# Patient Record
Sex: FEMALE | Race: BLACK OR AFRICAN AMERICAN | NOT HISPANIC OR LATINO | Employment: UNEMPLOYED | URBAN - METROPOLITAN AREA
[De-identification: names, ages, dates, MRNs, and addresses within clinical notes are randomized per-mention and may not be internally consistent; named-entity substitution may affect disease eponyms.]

---

## 2017-04-17 ENCOUNTER — HOSPITAL ENCOUNTER (EMERGENCY)
Facility: MEDICAL CENTER | Age: 22
End: 2017-04-17
Attending: EMERGENCY MEDICINE
Payer: MEDICAID

## 2017-04-17 ENCOUNTER — APPOINTMENT (OUTPATIENT)
Dept: RADIOLOGY | Facility: MEDICAL CENTER | Age: 22
End: 2017-04-17
Attending: EMERGENCY MEDICINE
Payer: MEDICAID

## 2017-04-17 VITALS
SYSTOLIC BLOOD PRESSURE: 109 MMHG | RESPIRATION RATE: 14 BRPM | HEART RATE: 99 BPM | HEIGHT: 66 IN | DIASTOLIC BLOOD PRESSURE: 68 MMHG | BODY MASS INDEX: 34.76 KG/M2 | TEMPERATURE: 99 F | OXYGEN SATURATION: 99 % | WEIGHT: 216.27 LBS

## 2017-04-17 DIAGNOSIS — M53.3 COCCYGEAL PAIN: ICD-10-CM

## 2017-04-17 PROCEDURE — 72220 X-RAY EXAM SACRUM TAILBONE: CPT

## 2017-04-17 PROCEDURE — 99284 EMERGENCY DEPT VISIT MOD MDM: CPT

## 2017-04-17 RX ORDER — DOCUSATE SODIUM 100 MG/1
100 CAPSULE, LIQUID FILLED ORAL 2 TIMES DAILY
Qty: 14 CAP | Refills: 0 | Status: SHIPPED | OUTPATIENT
Start: 2017-04-17 | End: 2017-12-03

## 2017-04-17 RX ORDER — DOCUSATE SODIUM 100 MG/1
100 CAPSULE, LIQUID FILLED ORAL 2 TIMES DAILY
Qty: 20 CAP | Refills: 0 | Status: SHIPPED | OUTPATIENT
Start: 2017-04-17 | End: 2017-04-17 | Stop reason: CLARIF

## 2017-04-17 RX ORDER — DOCUSATE SODIUM 100 MG/1
100 CAPSULE, LIQUID FILLED ORAL 2 TIMES DAILY
Qty: 14 CAP | Refills: 0 | Status: SHIPPED | OUTPATIENT
Start: 2017-04-17 | End: 2017-04-17 | Stop reason: CLARIF

## 2017-04-17 RX ORDER — HYDROCODONE BITARTRATE AND ACETAMINOPHEN 5; 325 MG/1; MG/1
1 TABLET ORAL EVERY 6 HOURS PRN
Qty: 15 TAB | Refills: 0 | Status: SHIPPED | OUTPATIENT
Start: 2017-04-17 | End: 2017-12-03

## 2017-04-17 ASSESSMENT — PAIN SCALES - GENERAL
PAINLEVEL_OUTOF10: 8
PAINLEVEL_OUTOF10: 6

## 2017-04-17 ASSESSMENT — LIFESTYLE VARIABLES: DO YOU DRINK ALCOHOL: NO

## 2017-04-17 NOTE — ED AVS SNAPSHOT
Home Care Instructions                                                                                                                Brianna Ruby   MRN: [id number]    Department:  [facility], Emergency Dept   Date of Visit:  4/17/2017            [facility], Emergency Dept    [address]    [place] [address]    Phone:  [phone]      You were seen by     Chester Batista M.D.      Your Diagnosis Was     Coccygeal pain     M53.3       Follow-up Information     1. Follow up with [facility].    Specialty:  Family Medicine    Why:  As needed    Contact information    [address]  [address]  [place] [id number] [phone]        Medication Information     Review all of your home medications and newly ordered medications with your primary doctor and/or pharmacist as soon as possible. Follow medication instructions as directed by your doctor and/or pharmacist.     Please keep your complete medication list with you and share with your physician. Update the information when medications are discontinued, doses are changed, or new medications (including over-the-counter products) are added; and carry medication information at all times in the event of emergency situations.               Medication List      START taking these medications        Instructions    Morning Afternoon Evening Bedtime    docusate sodium 100 MG Caps   Commonly known as:  COLACE        Take 1 Cap by mouth 2 times a day.   Dose:  100 mg                        hydrocodone-acetaminophen 5-325 MG Tabs per tablet   Commonly known as:  NORCO        Take 1 Tab by mouth every 6 hours as needed.   Dose:  1 Tab                             Where to Get Your Medications      These medications were sent to [facility] [place] - [address]  [address] [place] [address]     Phone:  [phone]    - docusate sodium 100 MG Caps      You can get these medications from any  pharmacy     Bring a paper prescription for each of these medications    - hydrocodone-acetaminophen 5-325 MG Tabs per tablet            Procedures and tests performed during your visit     DX-SACRUM AND COCCYX 2+        Discharge Instructions       Tailbone Injury  The tailbone (coccyx) is the small bone at the lower end of the spine. A tailbone injury may involve stretched ligaments, bruising, or a broken bone (fracture). Tailbone injuries can be painful, and some may take a long time to heal.  CAUSES  This condition is often caused by falling and landing on the tailbone. Other causes include:  · Repeated strain or friction from actions such as rowing and bicycling.  · Childbirth.  In some cases, the cause may not be known.  RISK FACTORS  This condition is more common in women than in men.  SYMPTOMS  Symptoms of this condition include:  · Pain in the lower back, especially when sitting.  · Pain or difficulty when standing up from a sitting position.  · Bruising in the tailbone area.  · Painful bowel movements.  · In women, pain during intercourse.  DIAGNOSIS  This condition may be diagnosed based on your symptoms and a physical exam. X-rays may be taken if a fracture is suspected. You may also have other tests, such as a CT scan or MRI.  TREATMENT  This condition may be treated with medicines to help relieve your pain. Most tailbone injuries heal on their own in 4-6 weeks. However, recovery time may be longer if the injury involves a fracture.  HOME CARE INSTRUCTIONS  · Take medicines only as directed by your health care provider.  · If directed, apply ice to the injured area:  ¨ Put ice in a plastic bag.  ¨ Place a towel between your skin and the bag.  ¨ Leave the ice on for 20 minutes, 2-3 times per day for the first 1-2 days.  · Sit on a large, rubber or inflated ring or cushion to ease your pain. Lean forward when you are sitting to help decrease discomfort.  · Avoid sitting for long periods of  time.  · Increase your activity as the pain allows. Perform any exercises that are recommended by your health care provider or physical therapist.  · If you have pain during bowel movements, use stool softeners as directed by your health care provider.  · Eat a diet that includes plenty of fiber to help prevent constipation.  · Keep all follow-up visits as directed by your health care provider. This is important.  PREVENTION  Wear appropriate padding and sports gear when bicycling and rowing. This can help to prevent developing an injury that is caused by repeated strain or friction.  SEEK MEDICAL CARE IF:  · Your pain becomes worse.  · Your bowel movements cause a great deal of discomfort.  · You are unable to have a bowel movement.  · You have uncontrolled urine loss (urinary incontinence).  · You have a fever.     This information is not intended to replace advice given to you by your health care provider. Make sure you discuss any questions you have with your health care provider.     Document Released: 12/15/2001 Document Revised: 05/03/2016 Document Reviewed: 12/14/2015  [employer] Interactive Patient Education ©2016 [employer]            Patient Information     Patient Information    Following emergency treatment: all patient requiring follow-up care must return either to a private physician or a clinic if your condition worsens before you are able to obtain further medical attention, please return to the emergency room.     Billing Information    At [facility], we work to make the billing process streamlined for our patients.  Our Representatives are here to answer any questions you may have regarding your hospital bill.  If you have insurance coverage and have supplied your insurance information to us, we will submit a claim to your insurer on your behalf.  Should you have any questions regarding your bill, we can be reached online or by phone as follows:  Online: You are able pay your bills online or  live chat with our representatives about any billing questions you may have. We are here to help Monday - Friday from 8:00am to 7:30pm and 9:00am - 12:00pm on Saturdays.  Please visit [email or web address]  for more information.   Phone:  [phone] or [phone]    Please note that your emergency physician, surgeon, pathologist, radiologist, anesthesiologist, and other specialists are not employed by [facility] and will therefore bill separately for their services.  Please contact them directly for any questions concerning their bills at the numbers below:     Emergency Physician Services:  [phone]  [facility]:  [phone]  [facility]:  [phone]  [facility]:  [phone]    1. Your final bill may vary from the amount quoted upon discharge if all procedures are not complete at that time, or if your doctor has additional procedures of which we are not aware. You will receive an additional bill if you return to the Emergency Department at [facility] for suture removal regardless of the facility of which the sutures were placed.     2. Please arrange for settlement of this account at the emergency registration.    3. All self-pay accounts are due in full at the time of treatment.  If you are unable to meet this obligation then payment is expected within 4-5 days.     4. If you have had radiology studies (CT, X-ray, Ultrasound, MRI), you have received a preliminary result during your emergency department visit. Please contact the radiology department [phone] to receive a copy of your final result. Please discuss the Final result with your primary physician or with the follow up physician provided.     Crisis Hotline:  National Crisis Hotline:  2-475-WOTBEXZ or 1-806.719.5617  [place] Crisis Hotline:    1-274.201.2134 or 883-281-4056         ED Discharge Follow Up Questions    1. In order to provide you with very  good care, we would like to follow up with a phone call in the next few days.  May we have your permission to contact you?     YES /  NO    2. What is the best phone number to call you? (       )_____-__________    3. What is the best time to call you?      Morning  /  Afternoon  /  Evening                   Patient Signature:  ____________________________________________________________    Date:  ____________________________________________________________

## 2017-04-17 NOTE — ED AVS SNAPSHOT
4/17/2017    Brianna Ruby  [address]  [place] [address]    Dear Brianna:    [facility] wants to ensure your discharge home is safe and you or your loved ones have had all of your questions answered regarding your care after you leave the hospital.    Below is a list of resources and contact information should you have any questions regarding your hospital stay, follow-up instructions, or active medical symptoms.    Questions or Concerns Regarding… Contact   Medical Questions Related to Your Discharge  (7 days a week, 8am-5pm) Contact a Nurse Care Coordinator   [phone]   Medical Questions Not Related to Your Discharge  (24 hours a day / 7 days a week)  Contact the Nurse Health Line   [phone]    Medications or Discharge Instructions Refer to your discharge packet   or contact your [facility] Primary Care Provider   [phone]   Follow-up Appointment(s) Schedule your appointment via [employer]   or contact Scheduling [phone]   Billing Review your statement via [employer]  or contact Billing [phone]   Medical Records Review your records via [employer]   or contact Medical Records [phone]     You may receive a telephone call within two days of discharge. This call is to make certain you understand your discharge instructions and have the opportunity to have any questions answered. You can also easily access your medical information, test results and upcoming appointments via the [employer] free online health management tool. You can learn more and sign up at [email or web address]. For assistance setting up your [employer] account, please call [phone].    Once again, we want to ensure your discharge home is safe and that you have a clear understanding of any next steps in your care. If you have any questions or concerns, please do not hesitate to contact us, we are here for you. Thank you for choosing [facility] for your healthcare needs.    Sincerely,    Your [facility] Healthcare Team

## 2017-04-17 NOTE — ED AVS SNAPSHOT
Lernstift Access Code: [email or web address]  Expires: 5/17/2017 11:02 PM    Lernstift  A secure, online tool to manage your health information     [employer]’s Lernstift® is a secure, online tool that connects you to your personalized health information from the privacy of your home -- day or night - making it very easy for you to manage your healthcare. Once the activation process is completed, you can even access your medical information using the Lernstift anahi, which is available for free in the Apple Anahi store or Google Play store.     Lernstift provides the following levels of access (as shown below):   My Chart Features   [facility] Primary Care Doctor [facility]  Specialists [facility]  Urgent  Care Non-[facility]  Primary Care  Doctor   Email your healthcare team securely and privately 24/7 X X X X   Manage appointments: schedule your next appointment; view details of past/upcoming appointments X      Request prescription refills. X      View recent personal medical records, including lab and immunizations X X X X   View health record, including health history, allergies, medications X X X X   Read reports about your outpatient visits, procedures, consult and ER notes X X X X   See your discharge summary, which is a recap of your hospital and/or ER visit that includes your diagnosis, lab results, and care plan. X X       How to register for Lernstift:  1. Go to  [email or web address].  2. Click on the Sign Up Now box, which takes you to the New Member Sign Up page. You will need to provide the following information:  a. Enter your Lernstift Access Code exactly as it appears at the top of this page. (You will not need to use this code after you’ve completed the sign-up process. If you do not sign up before the expiration date, you must request a new code.)   b. Enter your date of birth.   c. Enter your home email address.   d. Click Submit, and follow the next screen’s instructions.  3. Create a Lernstift ID. This will be your Lernstift  login ID and cannot be changed, so think of one that is secure and easy to remember.  4. Create a [facility] password. You can change your password at any time.  5. Enter your Password Reset Question and Answer. This can be used at a later time if you forget your password.   6. Enter your e-mail address. This allows you to receive e-mail notifications when new information is available in [facility].  7. Click Sign Up. You can now view your health information.    For assistance activating your [facility] account, call [phone]

## 2017-04-18 NOTE — ED NOTES
Chief Complaint   Patient presents with   • Back Pain     pt states she was wrestling/fighting and hurt her tailbone. pt states its been 1 month of tenderness and pain

## 2017-04-18 NOTE — ED PROVIDER NOTES
ED Provider Note    HPI: Patient is a 21-year-old female who presented to the emergency department April 17, 2017 at 7:21 PM with a chief complaint of coccygeal pain.    Patient was wrestling/fighting about a month ago. The patient twice was dropped onto her blood tox. The patient has pain in the coccygeal area. Patient notes pain with defecation. She denied numbness or tingling of her lower extremities. No other somatic complaints.    Review of Systems: Positive for coccygeal pain and pain with defecation negative for numbness tingling lower extremities incontinence    Past medical/surgical history: Strep throat asthma headache UTI    Medications: Depo-Provera    Allergies: Sulfa    Social History: Patient smokes several cigarettes daily no alcohol use       Physical exam: Constitutional: Pleasant female awake and alert  Vital signs:  Temperature 97.7 pulse 99 respirations 14 and blood pressure 109/68 pulse oximetry 95%  Musculoskeletal: Patient has pain with palpation of the upper portion of the coccygeal area. No crepitus felt over the exam somewhat problematic due to the patient's body habitus. No other pain with palpation or movement of muscle bone or joint. No other musculoskeletal deformities identified.  Neurologic: alert and awake answers questions appropriately. Moves all four extremities independently, no gross focal abnormalities identified. Normal strength and motor.  Skin: no rash or lesion seen, no palpable dermatologic lesions identified.  Psychiatric: not anxious, delusional, or hallucinating.    Medical decision making:  X-ray coccyx/sacrum obtained. No acute abnormality seen.    Given the presenting complaints and physical findings I suspect the patient has an occult coccygeal fracture. She was discharged on [place] [facility] [place]. I explained the patient is will likely take a couple more weeks to heal. Patient is applied to buy a doughnut to sit on which may help with her discomfort    Impression  coccygeal fracture

## 2017-04-18 NOTE — DISCHARGE INSTRUCTIONS
Tailbone Injury  The tailbone (coccyx) is the small bone at the lower end of the spine. A tailbone injury may involve stretched ligaments, bruising, or a broken bone (fracture). Tailbone injuries can be painful, and some may take a long time to heal.  CAUSES  This condition is often caused by falling and landing on the tailbone. Other causes include:  · Repeated strain or friction from actions such as rowing and bicycling.  · Childbirth.  In some cases, the cause may not be known.  RISK FACTORS  This condition is more common in women than in men.  SYMPTOMS  Symptoms of this condition include:  · Pain in the lower back, especially when sitting.  · Pain or difficulty when standing up from a sitting position.  · Bruising in the tailbone area.  · Painful bowel movements.  · In women, pain during intercourse.  DIAGNOSIS  This condition may be diagnosed based on your symptoms and a physical exam. X-rays may be taken if a fracture is suspected. You may also have other tests, such as a CT scan or MRI.  TREATMENT  This condition may be treated with medicines to help relieve your pain. Most tailbone injuries heal on their own in 4-6 weeks. However, recovery time may be longer if the injury involves a fracture.  HOME CARE INSTRUCTIONS  · Take medicines only as directed by your health care provider.  · If directed, apply ice to the injured area:  ¨ Put ice in a plastic bag.  ¨ Place a towel between your skin and the bag.  ¨ Leave the ice on for 20 minutes, 2-3 times per day for the first 1-2 days.  · Sit on a large, rubber or inflated ring or cushion to ease your pain. Lean forward when you are sitting to help decrease discomfort.  · Avoid sitting for long periods of time.  · Increase your activity as the pain allows. Perform any exercises that are recommended by your health care provider or physical therapist.  · If you have pain during bowel movements, use stool softeners as directed by your health care provider.  · Eat a  diet that includes plenty of fiber to help prevent constipation.  · Keep all follow-up visits as directed by your health care provider. This is important.  PREVENTION  Wear appropriate padding and sports gear when bicycling and rowing. This can help to prevent developing an injury that is caused by repeated strain or friction.  SEEK MEDICAL CARE IF:  · Your pain becomes worse.  · Your bowel movements cause a great deal of discomfort.  · You are unable to have a bowel movement.  · You have uncontrolled urine loss (urinary incontinence).  · You have a fever.     This information is not intended to replace advice given to you by your health care provider. Make sure you discuss any questions you have with your health care provider.     Document Released: 12/15/2001 Document Revised: 05/03/2016 Document Reviewed: 12/14/2015  [employer] Interactive Patient Education ©2016 [employer]

## 2017-04-18 NOTE — ED NOTES
All DC discussed for coccyx pain. She has been instructed to follow up with her primary and to returned to ER for increased pain and changes in bowel or bladder control.  Pt verbalized understanding of all DC instructions, prescriptions and follow up recommendations. Pt ambulated to lobby with upright and steady gait.

## 2017-04-18 NOTE — ED NOTES
Pt states she fell onto buttock x2. She has tenderness when sitting. Pain is now moving into low back. 5/5 strength to bilateral lower legs.

## 2017-12-03 ENCOUNTER — HOSPITAL ENCOUNTER (EMERGENCY)
Facility: MEDICAL CENTER | Age: 22
End: 2017-12-04
Attending: EMERGENCY MEDICINE
Payer: MEDICAID

## 2017-12-03 ENCOUNTER — APPOINTMENT (OUTPATIENT)
Dept: RADIOLOGY | Facility: MEDICAL CENTER | Age: 22
End: 2017-12-03
Attending: EMERGENCY MEDICINE
Payer: MEDICAID

## 2017-12-03 VITALS
TEMPERATURE: 97 F | OXYGEN SATURATION: 98 % | HEIGHT: 66 IN | HEART RATE: 87 BPM | BODY MASS INDEX: 36.14 KG/M2 | WEIGHT: 224.87 LBS | RESPIRATION RATE: 16 BRPM | DIASTOLIC BLOOD PRESSURE: 84 MMHG | SYSTOLIC BLOOD PRESSURE: 139 MMHG

## 2017-12-03 DIAGNOSIS — K29.00 ACUTE GASTRITIS WITHOUT HEMORRHAGE, UNSPECIFIED GASTRITIS TYPE: ICD-10-CM

## 2017-12-03 LAB
ALBUMIN SERPL BCP-MCNC: 3.8 G/DL (ref 3.2–4.9)
ALBUMIN/GLOB SERPL: 1.1 G/DL
ALP SERPL-CCNC: 66 U/L (ref 30–99)
ALT SERPL-CCNC: 19 U/L (ref 2–50)
ANION GAP SERPL CALC-SCNC: 7 MMOL/L (ref 0–11.9)
APPEARANCE UR: CLEAR
AST SERPL-CCNC: 17 U/L (ref 12–45)
BACTERIA #/AREA URNS HPF: NEGATIVE /HPF
BASOPHILS # BLD AUTO: 0.3 % (ref 0–1.8)
BASOPHILS # BLD: 0.03 K/UL (ref 0–0.12)
BILIRUB SERPL-MCNC: 0.2 MG/DL (ref 0.1–1.5)
BILIRUB UR QL STRIP.AUTO: NEGATIVE
BUN SERPL-MCNC: 15 MG/DL (ref 8–22)
CALCIUM SERPL-MCNC: 9.4 MG/DL (ref 8.5–10.5)
CHLORIDE SERPL-SCNC: 105 MMOL/L (ref 96–112)
CO2 SERPL-SCNC: 23 MMOL/L (ref 20–33)
COLOR UR: YELLOW
CREAT SERPL-MCNC: 0.88 MG/DL (ref 0.5–1.4)
EKG IMPRESSION: NORMAL
EOSINOPHIL # BLD AUTO: 0.6 K/UL (ref 0–0.51)
EOSINOPHIL NFR BLD: 6.3 % (ref 0–6.9)
EPI CELLS #/AREA URNS HPF: NORMAL /HPF
ERYTHROCYTE [DISTWIDTH] IN BLOOD BY AUTOMATED COUNT: 39.3 FL (ref 35.9–50)
GFR SERPL CREATININE-BSD FRML MDRD: >60 ML/MIN/1.73 M 2
GLOBULIN SER CALC-MCNC: 3.5 G/DL (ref 1.9–3.5)
GLUCOSE SERPL-MCNC: 102 MG/DL (ref 65–99)
GLUCOSE UR STRIP.AUTO-MCNC: NEGATIVE MG/DL
HCG SERPL QL: NEGATIVE
HCT VFR BLD AUTO: 41.4 % (ref 37–47)
HGB BLD-MCNC: 13.8 G/DL (ref 12–16)
HYALINE CASTS #/AREA URNS LPF: NORMAL /LPF
IMM GRANULOCYTES # BLD AUTO: 0.01 K/UL (ref 0–0.11)
IMM GRANULOCYTES NFR BLD AUTO: 0.1 % (ref 0–0.9)
KETONES UR STRIP.AUTO-MCNC: NEGATIVE MG/DL
LEUKOCYTE ESTERASE UR QL STRIP.AUTO: NEGATIVE
LIPASE SERPL-CCNC: 33 U/L (ref 11–82)
LYMPHOCYTES # BLD AUTO: 3.53 K/UL (ref 1–4.8)
LYMPHOCYTES NFR BLD: 37.3 % (ref 22–41)
MCH RBC QN AUTO: 29.1 PG (ref 27–33)
MCHC RBC AUTO-ENTMCNC: 33.3 G/DL (ref 33.6–35)
MCV RBC AUTO: 87.3 FL (ref 81.4–97.8)
MICRO URNS: ABNORMAL
MONOCYTES # BLD AUTO: 0.71 K/UL (ref 0–0.85)
MONOCYTES NFR BLD AUTO: 7.5 % (ref 0–13.4)
NEUTROPHILS # BLD AUTO: 4.59 K/UL (ref 2–7.15)
NEUTROPHILS NFR BLD: 48.5 % (ref 44–72)
NITRITE UR QL STRIP.AUTO: NEGATIVE
NRBC # BLD AUTO: 0 K/UL
NRBC BLD AUTO-RTO: 0 /100 WBC
PH UR STRIP.AUTO: 7 [PH]
PLATELET # BLD AUTO: 289 K/UL (ref 164–446)
PMV BLD AUTO: 8.6 FL (ref 9–12.9)
POTASSIUM SERPL-SCNC: 3.7 MMOL/L (ref 3.6–5.5)
PROT SERPL-MCNC: 7.3 G/DL (ref 6–8.2)
PROT UR QL STRIP: NEGATIVE MG/DL
RBC # BLD AUTO: 4.74 M/UL (ref 4.2–5.4)
RBC # URNS HPF: NORMAL /HPF
RBC UR QL AUTO: ABNORMAL
SODIUM SERPL-SCNC: 135 MMOL/L (ref 135–145)
SP GR UR STRIP.AUTO: 1.03
UROBILINOGEN UR STRIP.AUTO-MCNC: 1 MG/DL
WBC # BLD AUTO: 9.5 K/UL (ref 4.8–10.8)
WBC #/AREA URNS HPF: NORMAL /HPF

## 2017-12-03 PROCEDURE — 80053 COMPREHEN METABOLIC PANEL: CPT

## 2017-12-03 PROCEDURE — 93005 ELECTROCARDIOGRAM TRACING: CPT

## 2017-12-03 PROCEDURE — 93005 ELECTROCARDIOGRAM TRACING: CPT | Performed by: EMERGENCY MEDICINE

## 2017-12-03 PROCEDURE — 81001 URINALYSIS AUTO W/SCOPE: CPT

## 2017-12-03 PROCEDURE — 76705 ECHO EXAM OF ABDOMEN: CPT

## 2017-12-03 PROCEDURE — 83690 ASSAY OF LIPASE: CPT

## 2017-12-03 PROCEDURE — 85025 COMPLETE CBC W/AUTO DIFF WBC: CPT

## 2017-12-03 PROCEDURE — 84703 CHORIONIC GONADOTROPIN ASSAY: CPT

## 2017-12-03 PROCEDURE — 99284 EMERGENCY DEPT VISIT MOD MDM: CPT

## 2017-12-03 RX ORDER — ALUMINA, MAGNESIA, AND SIMETHICONE 2400; 2400; 240 MG/30ML; MG/30ML; MG/30ML
10 SUSPENSION ORAL 4 TIMES DAILY PRN
Qty: 560 ML | Refills: 0 | Status: SHIPPED | OUTPATIENT
Start: 2017-12-03 | End: 2019-10-02

## 2017-12-03 RX ORDER — OMEPRAZOLE 20 MG/1
20 CAPSULE, DELAYED RELEASE ORAL DAILY
Qty: 30 CAP | Refills: 0 | Status: SHIPPED | OUTPATIENT
Start: 2017-12-03 | End: 2019-10-02

## 2017-12-03 ASSESSMENT — ENCOUNTER SYMPTOMS
DIZZINESS: 0
ABDOMINAL PAIN: 1
MUSCULOSKELETAL NEGATIVE: 1
RESPIRATORY NEGATIVE: 1
VOMITING: 1
CARDIOVASCULAR NEGATIVE: 1
EYES NEGATIVE: 1
CONSTIPATION: 0
DIARRHEA: 0
SENSORY CHANGE: 0
NAUSEA: 1
FEVER: 0
TREMORS: 0

## 2017-12-03 ASSESSMENT — PAIN SCALES - GENERAL: PAINLEVEL_OUTOF10: 6

## 2017-12-04 NOTE — ED PROVIDER NOTES
ED Provider Note    CHIEF COMPLAINT  Chief Complaint   Patient presents with   • RUQ Pain     x1 month, intermittent, radiates to back.   • Back Pain     x1 month, intermittent pain between shoulder blades   • Nausea/Vomiting/Diarrhea   • Sweats     x1 day, cold sweats at night   • Epigastric Pain     pain radiates to RUQ and back.        HPI  Brianna Ruby is a 22 y.o. female who presents With abdominal pain. She reports abdominal pain for one month. Abdominal pain is epigastric and right upper quadrant. Abdominal pain is only after eating, around 5-10 minutes. Patient reports pain is generally after eating something spicy eating something greasy or eating too much. She reports mild associated nausea. She denies any association of pain with exertion and denies any decreased exertional capacity. She denies any associated shortness of breath. Patient reports that she had one episode of nonbilious nonbloody emesis today. The symptoms resolved without any intervention after around 5-10 minutes. She is currently not in any pain. She denies any changes in her bowel movements, she denies any blood or melena. She denies any fever or constitutional symptoms any dysuria and hematuria any vaginal discharge or vaginal bleeding outside of her cycle. Her last period was around one half weeks ago.    REVIEW OF SYSTEMS  Review of Systems   Constitutional: Negative for fever and malaise/fatigue.   HENT: Negative.    Eyes: Negative.    Respiratory: Negative.    Cardiovascular: Negative.    Gastrointestinal: Positive for abdominal pain, nausea and vomiting. Negative for constipation and diarrhea.   Genitourinary: Negative for dysuria, frequency and urgency.   Musculoskeletal: Negative.    Neurological: Negative for dizziness, tremors and sensory change.       See HPI for further details. All other systems are negative.     PAST MEDICAL HISTORY   has a past medical history of ASTHMA (1996); GBS (group B Streptococcus carrier),  +RV culture, currently pregnant (2/10/2014); Headache(784.0); Migraine; Strep throat; and Urinary tract infection, site not specified (8/4/13).    SOCIAL HISTORY  Social History     Social History Main Topics   • Smoking status: Current Every Day Smoker     Packs/day: 0.20     Years: 4.00     Types: Cigarettes     Last attempt to quit: 8/1/2014   • Smokeless tobacco: Never Used   • Alcohol use No   • Drug use: No   • Sexual activity: Yes     Partners: Male       SURGICAL HISTORY  patient denies any surgical history    CURRENT MEDICATIONS  Home Medications     Reviewed by Mai Aceves R.N. (Registered Nurse) on 12/03/17 at 2139  Med List Status: Complete   Medication Last Dose Status        Patient Darrell Taking any Medications                       ALLERGIES  Allergies   Allergen Reactions   • Sulfa Drugs        PHYSICAL EXAM  Physical Exam   Constitutional: She is oriented to person, place, and time. She appears well-developed and well-nourished.   HENT:   Head: Normocephalic and atraumatic.   Eyes: Conjunctivae are normal. Pupils are equal, round, and reactive to light.   Neck: Normal range of motion. Neck supple.   Abdominal: Soft. Bowel sounds are normal. She exhibits no distension and no mass. There is no rebound and no guarding.   Mild epigastric and right upper quadrant tenderness Francisco's negative   Neurological: She is alert and oriented to person, place, and time.   Skin: Skin is warm and dry.       Results for orders placed or performed during the hospital encounter of 12/03/17   CBC WITH DIFFERENTIAL   Result Value Ref Range    WBC 9.5 4.8 - 10.8 K/uL    RBC 4.74 4.20 - 5.40 M/uL    Hemoglobin 13.8 12.0 - 16.0 g/dL    Hematocrit 41.4 37.0 - 47.0 %    MCV 87.3 81.4 - 97.8 fL    MCH 29.1 27.0 - 33.0 pg    MCHC 33.3 (L) 33.6 - 35.0 g/dL    RDW 39.3 35.9 - 50.0 fL    Platelet Count 289 164 - 446 K/uL    MPV 8.6 (L) 9.0 - 12.9 fL    Neutrophils-Polys 48.50 44.00 - 72.00 %    Lymphocytes 37.30 22.00 -  41.00 %    Monocytes 7.50 0.00 - 13.40 %    Eosinophils 6.30 0.00 - 6.90 %    Basophils 0.30 0.00 - 1.80 %    Immature Granulocytes 0.10 0.00 - 0.90 %    Nucleated RBC 0.00 /100 WBC    Neutrophils (Absolute) 4.59 2.00 - 7.15 K/uL    Lymphs (Absolute) 3.53 1.00 - 4.80 K/uL    Monos (Absolute) 0.71 0.00 - 0.85 K/uL    Eos (Absolute) 0.60 (H) 0.00 - 0.51 K/uL    Baso (Absolute) 0.03 0.00 - 0.12 K/uL    Immature Granulocytes (abs) 0.01 0.00 - 0.11 K/uL    NRBC (Absolute) 0.00 K/uL   COMP METABOLIC PANEL   Result Value Ref Range    Sodium 135 135 - 145 mmol/L    Potassium 3.7 3.6 - 5.5 mmol/L    Chloride 105 96 - 112 mmol/L    Co2 23 20 - 33 mmol/L    Anion Gap 7.0 0.0 - 11.9    Glucose 102 (H) 65 - 99 mg/dL    Bun 15 8 - 22 mg/dL    Creatinine 0.88 0.50 - 1.40 mg/dL    Calcium 9.4 8.5 - 10.5 mg/dL    AST(SGOT) 17 12 - 45 U/L    ALT(SGPT) 19 2 - 50 U/L    Alkaline Phosphatase 66 30 - 99 U/L    Total Bilirubin 0.2 0.1 - 1.5 mg/dL    Albumin 3.8 3.2 - 4.9 g/dL    Total Protein 7.3 6.0 - 8.2 g/dL    Globulin 3.5 1.9 - 3.5 g/dL    A-G Ratio 1.1 g/dL   LIPASE   Result Value Ref Range    Lipase 33 11 - 82 U/L   BETA-HCG QUALITATIVE SERUM   Result Value Ref Range    Beta-Hcg Qualitative Serum Negative Negative   ESTIMATED GFR   Result Value Ref Range    GFR If African American >60 >60 mL/min/1.73 m 2    GFR If Non African American >60 >60 mL/min/1.73 m 2   URINALYSIS (UA)   Result Value Ref Range    Color Yellow     Character Clear     Specific Gravity 1.027 <1.035    Ph 7.0 5.0 - 8.0    Glucose Negative Negative mg/dL    Ketones Negative Negative mg/dL    Protein Negative Negative mg/dL    Bilirubin Negative Negative    Urobilinogen, Urine 1.0 Negative    Nitrite Negative Negative    Leukocyte Esterase Negative Negative    Occult Blood Trace (A) Negative    Micro Urine Req Microscopic    URINE MICROSCOPIC (W/UA)   Result Value Ref Range    WBC 0-2 /hpf    RBC 0-2 /hpf    Bacteria Negative None /hpf    Epithelial Cells Few /hpf     Hyaline Cast 0-2 /lpf   EKG (NOW)   Result Value Ref Range    Report       [facility]    Test Date:  2017  Pt Name:    HARINDER BRADFORD               Department: ER  MRN:        [id number]                      Room:  Gender:     F                            Technician: [id number]  :        1995                   Requested By:ER TRIAGE PROTOCOL  Order #:    [id number]                    Reading MD:    Measurements  Intervals                                Axis  Rate:       73                           P:          39  MA:         152                          QRS:        36  QRSD:       78                           T:          23  QT:         364  QTc:        401    Interpretive Statements  SINUS RHYTHM  Compared to ECG 2014 07:35:21  Sinus tachycardia no longer present  Left ventricular hypertrophy no longer present         US-GALLBLADDER   Final Result      1.  Contracted gallbladder. No cholelithiasis or biliary dilatation.      2.  Mildly enlarged homogeneous liver.      3.  Normal appearance of the pancreas.      4.  Suspected medullary sponge kidney variant of both kidneys. No hydronephrosis.            COURSE & MEDICAL DECISION MAKING  Pertinent Labs & Imaging studies reviewed. (See chart for details)  1. Patient here with symptoms consistent with likely choledocholithiasis. Will check ultrasound and basic labs to rule out cholecystitis. Patient without any icterus or signs of choledocholithiasis. Will add bilirubin. Patient is currently not in any pain and is not requesting any analgesics at this time. We'll continue to reassess  Ultrasound is unremarkable. Basic labs are unremarkable. Patient with likely gastritis. Home with supportive care, and Maalox when necessary and omeprazole. Patient will follow up with her primary care physician and understands the symptoms do not resolve within the next month after treatment that possible gastrointestinal appointment  should be made for further evaluation and possible endoscopy. Return precautions otherwise have been discussed    The patient will not drink alcohol nor drive with prescribed medications. The patient will return for worsening symptoms and is stable at the time of discharge. The patient verbalizes understanding and will comply.    FINAL IMPRESSION  1. Gastritis         Electronically signed by: Lele Fleming, 12/3/2017 10:00 PM

## 2017-12-04 NOTE — ED NOTES
Brianna Ruby  22 y.o.  Female  Chief Complaint   Patient presents with   • RUQ Pain     x1 month, intermittent, radiates to back.   • Back Pain     x1 month, intermittent pain between shoulder blades   • Nausea/Vomiting/Diarrhea   • Sweats     x1 day, cold sweats at night   • Epigastric Pain     pain radiates to RUQ and back.      Patient with tech for EKG.    Explained wait time and triage process to pt, told to notify ED tech or triage RN of any changes, verbalized understanding.

## 2017-12-04 NOTE — DISCHARGE INSTRUCTIONS
Gastritis, Adult  Gastritis is soreness and puffiness (inflammation) of the lining of the stomach. If you do not get help, gastritis can cause bleeding and sores (ulcers) in the stomach.  HOME CARE   · Only take medicine as told by your doctor.  · If you were given antibiotic medicines, take them as told. Finish the medicines even if you start to feel better.  · Drink enough fluids to keep your pee (urine) clear or pale yellow.  · Avoid foods and drinks that make your problems worse. Foods you may want to avoid include:  ¨ Caffeine or alcohol.  ¨ Chocolate.  ¨ Mint.  ¨ Garlic and onions.  ¨ Spicy foods.  ¨ Citrus fruits, including oranges, jamarcus, or limes.  ¨ Food containing tomatoes, including sauce, chili, salsa, and pizza.  ¨ Fried and fatty foods.  · Eat small meals throughout the day instead of large meals.  GET HELP RIGHT AWAY IF:   · You have black or dark red poop (stools).  · You throw up (vomit) blood. It may look like coffee grounds.  · You cannot keep fluids down.  · Your belly (abdominal) pain gets worse.  · You have a fever.  · You do not feel better after 1 week.  · You have any other questions or concerns.  MAKE SURE YOU:   · Understand these instructions.  · Will watch your condition.  · Will get help right away if you are not doing well or get worse.     This information is not intended to replace advice given to you by your health care provider. Make sure you discuss any questions you have with your health care provider.     Document Released: 06/05/2009 Document Revised: 03/11/2013 Document Reviewed: 01/30/2013  [employer] Interactive Patient Education ©2016 [employer]

## 2018-11-05 ENCOUNTER — HOSPITAL ENCOUNTER (EMERGENCY)
Facility: MEDICAL CENTER | Age: 23
End: 2018-11-05
Attending: EMERGENCY MEDICINE
Payer: MEDICAID

## 2018-11-05 ENCOUNTER — APPOINTMENT (OUTPATIENT)
Dept: RADIOLOGY | Facility: MEDICAL CENTER | Age: 23
End: 2018-11-05
Attending: EMERGENCY MEDICINE
Payer: MEDICAID

## 2018-11-05 ENCOUNTER — APPOINTMENT (OUTPATIENT)
Dept: URGENT CARE | Facility: CLINIC | Age: 23
End: 2018-11-05
Payer: MEDICAID

## 2018-11-05 VITALS
WEIGHT: 231.48 LBS | RESPIRATION RATE: 16 BRPM | DIASTOLIC BLOOD PRESSURE: 78 MMHG | HEART RATE: 88 BPM | HEIGHT: 66 IN | TEMPERATURE: 97.8 F | BODY MASS INDEX: 37.2 KG/M2 | SYSTOLIC BLOOD PRESSURE: 136 MMHG | OXYGEN SATURATION: 96 %

## 2018-11-05 DIAGNOSIS — J45.909 ACUTE ASTHMA: ICD-10-CM

## 2018-11-05 PROCEDURE — 99284 EMERGENCY DEPT VISIT MOD MDM: CPT

## 2018-11-05 PROCEDURE — 71046 X-RAY EXAM CHEST 2 VIEWS: CPT

## 2018-11-05 RX ORDER — ALBUTEROL SULFATE 90 UG/1
2 AEROSOL, METERED RESPIRATORY (INHALATION) EVERY 6 HOURS PRN
Qty: 1 INHALER | Refills: 0 | Status: SHIPPED | OUTPATIENT
Start: 2018-11-05 | End: 2019-10-02

## 2018-11-05 NOTE — ED NOTES
Discharge orders received from ERP. New prescriptions received x 2. Provided education and patient demonstrated understanding. Pt ambulatory off unit. All personal belonging with patient.

## 2018-11-05 NOTE — DISCHARGE INSTRUCTIONS
Asthma /Acute Bronchospasm     Use albuterol every 4-6 hours for shortness of breath, wheeze and cough.  Take inhaled steroid when ill and needing albuterol.  Return for worsening shortness of breath, ill appearance or if you need to use albuterol more often than every 4 hours.  See your doctor in 3-4 days if not much better.    You had an elevated or high normal blood pressure reading today.  This does not necessarily mean you have hypertension.  Please followup with your/a primary physician for comprehensive blood pressure evaluation.  BP Readings from Last 3 Encounters:   11/05/18 140/89   12/03/17 139/84   04/17/17 109/68       Your exam shows you have asthma, or acute bronchospasm that acts like asthma. Bronchospasm means your air passages become narrowed. These conditions are due to inflammation and airway spasm that cause narrowing of the bronchial tubes in the lungs. This causes you to have wheezing and shortness of breath.     CAUSES  Respiratory infections and allergies most often bring on these attacks. Smoking, air pollution, cold air, emotional upsets, and vigorous exercise can also bring them on.      TREATMENT  Ø Treatment is aimed at making the narrowed airways larger. Mild asthma/bronchospasm is usually controlled with inhaled medicines. Albuterol is a common medicine that you breathe in to open spastic or narrowed airways. Some trade names for albuterol are Ventolin or Proventil.  Steroid medicine is also used to reduce the inflammation when an attack is moderate or severe. Antibiotics (medications used to kill germs) are only used if a bacterial infection is present.   Ø If you are pregnant and need to use Albuterol (Ventolin or Proventil), you can expect the baby to move more than usual shortly after the medicine is used.      HOME CARE INSTRUCTIONS  Ø Rest.  Ø Drink plenty of liquids. This helps the mucous to remain thin and easily coughed up. Do not use caffeine or alcohol.  Ø Do not smoke.  Avoid being exposed to second-hand smoke.    Ø You play a critical role in keeping yourself in good health. Avoid exposure to things that cause you to wheeze. Avoid exposure to things that cause you to have breathing problems. Keep your medications up-to-date and available. Carefully follow your doctor?s treatment plan.      Ø When pollen or pollution is bad, keep windows closed and use an air conditioner go to places with air conditioning. If you are allergic to furry pets or birds, find new homes for them or keep them outside.  Ø Take your medicine exactly as prescribed.  Ø Asthma requires careful medical attention. See your caregiver for follow-up as advised. If you are  more than 24 weeks pregnant and you were prescribed any new medications, let your Obstetrician know about the visit and how you are doing. Arrange a recheck.     SEEK IMMEDIATE MEDICAL CARE IF:  Ø You are getting worse.Ø You have trouble breathing. If severe, call 911.  Ø You develop chest pain or discomfort.  Ø You are throwing up or not drinking fluids. Ø You are not getting better within 24 hours.Ø You are coughing up yellow, green, brown, or bloody sputum.  Ø You develop a fever over 102º F (38.9º C).  Ø You have trouble swallowing.      Document Released: 04/03/2008  Document Re-Released: 06/15/2009  [employer]® Patient Information ©2009 [employer].

## 2018-11-05 NOTE — ED PROVIDER NOTES
"ED Provider Note    CHIEF COMPLAINT  Chief Complaint   Patient presents with   • Cough     x 1.5 weeks, productive w/green sputum   • Rib Pain       HPI  Brianna Mirlande Ruby is a 23 y.o. female who presents for 10 days of cough productive of green sputum with rhinorrhea headaches and body aches but no fever.  History of asthma out of her only medicine albuterol.  3 days she has had some shortness of breath and nighttime wheezing.  No tobacco use.  No influenza vaccine.    REVIEW OF SYSTEMS  Pertinent positives include: Cough, nasal congestion, bilateral anterior rib pain with cough, headache, body aches.  Pertinent negatives include: Tobacco use, fever, pregnancy, vomiting, diarrhea, abdominal pain, rash.    PAST MEDICAL HISTORY  Past Medical History:   Diagnosis Date   • ASTHMA 1996    No recent med use; dx as a baby   • GBS (group B Streptococcus carrier), +RV culture, currently pregnant 2/10/2014   • Headache(784.0)    • Migraine    • Strep throat    • Urinary tract infection, site not specified 8/4/13    treated       SOCIAL HISTORY  No tobacco use.    CURRENT MEDICATIONS  Home Medications     Reviewed by Margo Plummer R.N. (Registered Nurse) on 11/05/18 at 1301  Med List Status: Unable to Obtain   Medication Last Dose Status   mag hydrox-al hydrox-simeth (MAALOX PLUS ES OR MYLANTA DS) 400-400-40 MG/5ML Suspension Not Taking Active   omeprazole (PRILOSEC) 20 MG delayed-release capsule Not Taking Active                ALLERGIES  Allergies   Allergen Reactions   • Clindamycin Hives   • Sulfa Drugs        PHYSICAL EXAM  VITAL SIGNS: /89   Pulse (!) 104   Temp 36.4 °C (97.6 °F) (Temporal)   Resp 20   Ht 1.676 m (5' 6\")   Wt 105 kg (231 lb 7.7 oz)   LMP 10/30/2018 (Exact Date)   SpO2 95%   BMI 37.36 kg/m²   Constitutional :  Well developed, Well nourished, well-appearing, tachycardic, mild blood pressure elevation, no hypoxia on room air by my interpretation.   HNT: Oropharynx moist with 2+ tonsils " without erythema or exudate.   Ears: External ears normal.  Eyes: pupils reactive without eye discharge nor conjunctival hyperemia.  Neck: Normal range of motion, No tenderness, Supple, No stridor.   Lymphatic: No adenopathy.   Cardiovascular: Tachycardic regular rhythm, No murmurs, No rubs, No gallops.  No cyanosis.   Respiratory: Good airflow without rales, rhonchi, wheeze  Abdomen:  Soft, nontender  Skin: Warm, dry, no erythema, no rash.   Musculoskeletal: no limb deformities.    RADIOLOGY:  DX-CHEST-2 VIEWS   Final Result      No acute cardiopulmonary disease evident.          COURSE & MEDICAL DECISION MAKING  Well-appearing patient presents with a probable viral syndrome and a mild asthma exacerbation that seems to be bothering her mostly at nights.  There is no pneumonia or hypoxia.  She did declined albuterol treatment now.    This patient has borderline or elevated blood pressure as recorded above and was instructed to followup with primary physician for comprehensive blood pressure evaluation and yearly fasting cholesterol assessment according to to CMS protocol.    PLAN:  New Prescriptions    ALBUTEROL 108 (90 BASE) MCG/ACT AERO SOLN INHALATION AEROSOL    Inhale 2 Puffs by mouth every 6 hours as needed for Shortness of Breath.    BECLOMETHASONE (QVAR) 40 MCG/ACT INHALER    Inhale 1 Puff by mouth 2 Times a Day.       Asthma handout given  Return for worsening shortness of breath, ill appearance    [facility]  [address]  [address]  [place] [id number]  [phone]    Schedule an appointment as soon as possible for a visit   As needed if not better in 3-4 days      CONDITION:  Good.    FINAL IMPRESSION:  1. Acute asthma    2.      Acute viral syndrome      Electronically signed by: Teto Garcia, 11/5/2018

## 2018-11-05 NOTE — ED TRIAGE NOTES
"Chief Complaint   Patient presents with   • Cough     x 1.5 weeks, productive w/green sputum   • Rib Pain     ./89   Pulse (!) 104   Temp 36.4 °C (97.6 °F) (Temporal)   Resp 20   Ht 1.676 m (5' 6\")   Wt 105 kg (231 lb 7.7 oz)   LMP 10/30/2018 (Exact Date)   SpO2 95%   BMI 37.36 kg/m²     Ambulatory to triage with above complaints, educated on triage process, placed in lobby, told to inform staff of any changes in condition.    "

## 2018-11-06 ENCOUNTER — PATIENT OUTREACH (OUTPATIENT)
Dept: HEALTH INFORMATION MANAGEMENT | Facility: OTHER | Age: 23
End: 2018-11-06

## 2019-09-14 ENCOUNTER — HOSPITAL ENCOUNTER (EMERGENCY)
Facility: MEDICAL CENTER | Age: 24
End: 2019-09-14
Attending: EMERGENCY MEDICINE
Payer: MEDICAID

## 2019-09-14 VITALS
OXYGEN SATURATION: 99 % | BODY MASS INDEX: 35.94 KG/M2 | DIASTOLIC BLOOD PRESSURE: 91 MMHG | TEMPERATURE: 98.2 F | RESPIRATION RATE: 16 BRPM | HEART RATE: 87 BPM | SYSTOLIC BLOOD PRESSURE: 131 MMHG | WEIGHT: 222.66 LBS

## 2019-09-14 DIAGNOSIS — L30.9 DERMATITIS: ICD-10-CM

## 2019-09-14 PROCEDURE — 99283 EMERGENCY DEPT VISIT LOW MDM: CPT

## 2019-09-14 RX ORDER — NYSTATIN 100000 U/G
OINTMENT TOPICAL
Qty: 1 TUBE | Refills: 0 | Status: SHIPPED | OUTPATIENT
Start: 2019-09-14 | End: 2019-10-02

## 2019-09-14 NOTE — ED PROVIDER NOTES
ED Provider Note    Scribed for Joe Irving M.D. by Joe Irving. 9/14/2019  8:24 AM    CHIEF COMPLAINT  Chief Complaint   Patient presents with   • Rash       HPI  Brianna Ruby is a 24 y.o. female who presents to the Emergency Room because of a rash under her breast that she noticed yesterday.  She works at a care facility, and has a patient with a similar rash, and is concerned that she may have caught it from the patient.  She has not had fevers or chills or nausea or vomiting or chest pain or shortness of breath.  The rash is slightly itchy.  It is not burning.  There are no blisters.  She is not diabetic or immunosuppressed.    REVIEW OF SYSTEMS  See HPI for further details.    PAST MEDICAL HISTORY   has a past medical history of ASTHMA (1996), GBS (group B Streptococcus carrier), +RV culture, currently pregnant (2/10/2014), Headache(784.0), Migraine, Strep throat, and Urinary tract infection, site not specified (8/4/13).    SOCIAL HISTORY  Social History     Tobacco Use   • Smoking status: Current Every Day Smoker     Packs/day: 0.25     Years: 6.00     Pack years: 1.50     Types: Cigarettes     Last attempt to quit: 8/1/2016     Years since quitting: 3.1   • Smokeless tobacco: Never Used   Substance and Sexual Activity   • Alcohol use: No   • Drug use: Yes     Comment: THC   • Sexual activity: Yes     Partners: Male       SURGICAL HISTORY  patient denies any surgical history    CURRENT MEDICATIONS  Home Medications    **Home medications have not yet been reviewed for this encounter**         ALLERGIES  Allergies   Allergen Reactions   • Clindamycin Hives   • Sulfa Drugs        PHYSICAL EXAM  VITAL SIGNS: /91   Pulse 87   Temp 36.8 °C (98.2 °F) (Temporal)   Resp 16   Wt 101 kg (222 lb 10.6 oz)   LMP 08/13/2019   SpO2 99%   BMI 35.94 kg/m²   Pulse ox interpretation: I interpret this pulse ox as normal.  Constitutional: Alert in no apparent distress.  HENT: Normocephalic,  Atraumatic, Bilateral external ears normal. Nose normal.   Eyes: Conjunctiva normal, non-icteric.   Heart: Normal peripheral perfusion.  Lungs: Unlabored respirations.  Skin: Erythematous well demarcated patches without satellite lesions in the skin folds under both breasts.  Neurologic: Alert, Grossly non-focal.   Psychiatric: Affect normal, Judgment normal, Mood normal, Appears appropriate and not intoxicated.     COURSE & MEDICAL DECISION MAKING  The patient's VS, Nurses notes reviewed. (See chart for details)    8:24 AM Patient seen and examined at bedside.  She has a skin rash under her breast folds bilaterally that is strongly suggestive of Candida dermatitis.  I explained that it is a common location for rashes, and unlikely that it was communicated to her by the patient she was taking care of.  Should be treated with topical nystatin.     The patient will return for new or worsening symptoms and is stable at the time of discharge.    The patient is referred to a primary physician for blood pressure management, diabetic screening, and for all other preventative health concerns.      DISPOSITION:  Patient will be discharged home in stable condition.    OUTPATIENT MEDICATIONS:  New Prescriptions    NYSTATIN (MYCOSTATIN) 245130 UNIT/GM OINTMENT    Apply a thin film to the affected area twice daily until symptoms improve.         FINAL IMPRESSION  1. Dermatitis

## 2019-09-14 NOTE — ED TRIAGE NOTES
Amb to triage w/ c/o a itchy/painful rash under her breasts x1 day.  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

## 2019-10-02 ENCOUNTER — APPOINTMENT (OUTPATIENT)
Dept: RADIOLOGY | Facility: MEDICAL CENTER | Age: 24
End: 2019-10-02
Attending: EMERGENCY MEDICINE
Payer: MEDICAID

## 2019-10-02 ENCOUNTER — HOSPITAL ENCOUNTER (OUTPATIENT)
Facility: MEDICAL CENTER | Age: 24
End: 2019-10-05
Attending: EMERGENCY MEDICINE | Admitting: HOSPITALIST
Payer: MEDICAID

## 2019-10-02 DIAGNOSIS — R10.84 GENERALIZED ABDOMINAL PAIN: ICD-10-CM

## 2019-10-02 DIAGNOSIS — R11.2 NON-INTRACTABLE VOMITING WITH NAUSEA, UNSPECIFIED VOMITING TYPE: ICD-10-CM

## 2019-10-02 LAB
ALBUMIN SERPL BCP-MCNC: 4.3 G/DL (ref 3.2–4.9)
ALBUMIN/GLOB SERPL: 1.3 G/DL
ALP SERPL-CCNC: 64 U/L (ref 30–99)
ALT SERPL-CCNC: 11 U/L (ref 2–50)
ANION GAP SERPL CALC-SCNC: 7 MMOL/L (ref 0–11.9)
APPEARANCE UR: ABNORMAL
AST SERPL-CCNC: 12 U/L (ref 12–45)
BACTERIA #/AREA URNS HPF: ABNORMAL /HPF
BASOPHILS # BLD AUTO: 0.3 % (ref 0–1.8)
BASOPHILS # BLD: 0.06 K/UL (ref 0–0.12)
BILIRUB SERPL-MCNC: 0.4 MG/DL (ref 0.1–1.5)
BILIRUB UR QL STRIP.AUTO: NEGATIVE
BUN SERPL-MCNC: 13 MG/DL (ref 8–22)
CALCIUM SERPL-MCNC: 9.1 MG/DL (ref 8.5–10.5)
CHLORIDE SERPL-SCNC: 111 MMOL/L (ref 96–112)
CO2 SERPL-SCNC: 23 MMOL/L (ref 20–33)
COLOR UR: YELLOW
CREAT SERPL-MCNC: 0.69 MG/DL (ref 0.5–1.4)
CRP SERPL HS-MCNC: 0.16 MG/DL (ref 0–0.75)
EKG IMPRESSION: NORMAL
EOSINOPHIL # BLD AUTO: 0.31 K/UL (ref 0–0.51)
EOSINOPHIL NFR BLD: 1.8 % (ref 0–6.9)
EPI CELLS #/AREA URNS HPF: ABNORMAL /HPF
ERYTHROCYTE [DISTWIDTH] IN BLOOD BY AUTOMATED COUNT: 40.4 FL (ref 35.9–50)
ERYTHROCYTE [SEDIMENTATION RATE] IN BLOOD BY WESTERGREN METHOD: 28 MM/HOUR (ref 0–20)
GLOBULIN SER CALC-MCNC: 3.2 G/DL (ref 1.9–3.5)
GLUCOSE SERPL-MCNC: 106 MG/DL (ref 65–99)
GLUCOSE UR STRIP.AUTO-MCNC: NEGATIVE MG/DL
HCG SERPL QL: NEGATIVE
HCT VFR BLD AUTO: 46.3 % (ref 37–47)
HGB BLD-MCNC: 15.2 G/DL (ref 12–16)
HIV 1+2 AB+HIV1 P24 AG SERPL QL IA: NON REACTIVE
HYALINE CASTS #/AREA URNS LPF: ABNORMAL /LPF
IMM GRANULOCYTES # BLD AUTO: 0.05 K/UL (ref 0–0.11)
IMM GRANULOCYTES NFR BLD AUTO: 0.3 % (ref 0–0.9)
KETONES UR STRIP.AUTO-MCNC: ABNORMAL MG/DL
LEUKOCYTE ESTERASE UR QL STRIP.AUTO: ABNORMAL
LIPASE SERPL-CCNC: 15 U/L (ref 11–82)
LYMPHOCYTES # BLD AUTO: 1.26 K/UL (ref 1–4.8)
LYMPHOCYTES NFR BLD: 7.1 % (ref 22–41)
MAGNESIUM SERPL-MCNC: 1.5 MG/DL (ref 1.5–2.5)
MCH RBC QN AUTO: 29.1 PG (ref 27–33)
MCHC RBC AUTO-ENTMCNC: 32.8 G/DL (ref 33.6–35)
MCV RBC AUTO: 88.7 FL (ref 81.4–97.8)
MICRO URNS: ABNORMAL
MONOCYTES # BLD AUTO: 0.82 K/UL (ref 0–0.85)
MONOCYTES NFR BLD AUTO: 4.6 % (ref 0–13.4)
NEUTROPHILS # BLD AUTO: 15.18 K/UL (ref 2–7.15)
NEUTROPHILS NFR BLD: 85.9 % (ref 44–72)
NITRITE UR QL STRIP.AUTO: NEGATIVE
NRBC # BLD AUTO: 0 K/UL
NRBC BLD-RTO: 0 /100 WBC
PH UR STRIP.AUTO: 5.5 [PH] (ref 5–8)
PHOSPHATE SERPL-MCNC: 2.9 MG/DL (ref 2.5–4.5)
PLATELET # BLD AUTO: 342 K/UL (ref 164–446)
PMV BLD AUTO: 9 FL (ref 9–12.9)
POTASSIUM SERPL-SCNC: 3.6 MMOL/L (ref 3.6–5.5)
PROT SERPL-MCNC: 7.5 G/DL (ref 6–8.2)
PROT UR QL STRIP: NEGATIVE MG/DL
RBC # BLD AUTO: 5.22 M/UL (ref 4.2–5.4)
RBC # URNS HPF: ABNORMAL /HPF
RBC UR QL AUTO: ABNORMAL
SODIUM SERPL-SCNC: 141 MMOL/L (ref 135–145)
SP GR UR STRIP.AUTO: 1.03
T4 FREE SERPL-MCNC: 0.83 NG/DL (ref 0.53–1.43)
TSH SERPL DL<=0.005 MIU/L-ACNC: <0.005 UIU/ML (ref 0.38–5.33)
UROBILINOGEN UR STRIP.AUTO-MCNC: 0.2 MG/DL
WBC # BLD AUTO: 17.7 K/UL (ref 4.8–10.8)
WBC #/AREA URNS HPF: ABNORMAL /HPF

## 2019-10-02 PROCEDURE — 99285 EMERGENCY DEPT VISIT HI MDM: CPT

## 2019-10-02 PROCEDURE — G0378 HOSPITAL OBSERVATION PER HR: HCPCS

## 2019-10-02 PROCEDURE — 86140 C-REACTIVE PROTEIN: CPT

## 2019-10-02 PROCEDURE — 84439 ASSAY OF FREE THYROXINE: CPT

## 2019-10-02 PROCEDURE — 700101 HCHG RX REV CODE 250: Performed by: HOSPITALIST

## 2019-10-02 PROCEDURE — A9270 NON-COVERED ITEM OR SERVICE: HCPCS | Performed by: HOSPITALIST

## 2019-10-02 PROCEDURE — 99220 PR INITIAL OBSERVATION CARE,LEVL III: CPT | Mod: 25 | Performed by: HOSPITALIST

## 2019-10-02 PROCEDURE — 93005 ELECTROCARDIOGRAM TRACING: CPT

## 2019-10-02 PROCEDURE — 83690 ASSAY OF LIPASE: CPT

## 2019-10-02 PROCEDURE — 84443 ASSAY THYROID STIM HORMONE: CPT

## 2019-10-02 PROCEDURE — 96366 THER/PROPH/DIAG IV INF ADDON: CPT

## 2019-10-02 PROCEDURE — 74177 CT ABD & PELVIS W/CONTRAST: CPT

## 2019-10-02 PROCEDURE — 81001 URINALYSIS AUTO W/SCOPE: CPT

## 2019-10-02 PROCEDURE — 83516 IMMUNOASSAY NONANTIBODY: CPT

## 2019-10-02 PROCEDURE — 85652 RBC SED RATE AUTOMATED: CPT

## 2019-10-02 PROCEDURE — 80053 COMPREHEN METABOLIC PANEL: CPT

## 2019-10-02 PROCEDURE — 93005 ELECTROCARDIOGRAM TRACING: CPT | Performed by: EMERGENCY MEDICINE

## 2019-10-02 PROCEDURE — 82784 ASSAY IGA/IGD/IGG/IGM EACH: CPT

## 2019-10-02 PROCEDURE — 84703 CHORIONIC GONADOTROPIN ASSAY: CPT

## 2019-10-02 PROCEDURE — 85025 COMPLETE CBC W/AUTO DIFF WBC: CPT

## 2019-10-02 PROCEDURE — 96365 THER/PROPH/DIAG IV INF INIT: CPT | Mod: XU

## 2019-10-02 PROCEDURE — 700105 HCHG RX REV CODE 258: Performed by: EMERGENCY MEDICINE

## 2019-10-02 PROCEDURE — 83735 ASSAY OF MAGNESIUM: CPT

## 2019-10-02 PROCEDURE — 700111 HCHG RX REV CODE 636 W/ 250 OVERRIDE (IP): Performed by: EMERGENCY MEDICINE

## 2019-10-02 PROCEDURE — 96375 TX/PRO/DX INJ NEW DRUG ADDON: CPT

## 2019-10-02 PROCEDURE — 87389 HIV-1 AG W/HIV-1&-2 AB AG IA: CPT

## 2019-10-02 PROCEDURE — 700117 HCHG RX CONTRAST REV CODE 255: Performed by: EMERGENCY MEDICINE

## 2019-10-02 PROCEDURE — 36415 COLL VENOUS BLD VENIPUNCTURE: CPT

## 2019-10-02 PROCEDURE — 84100 ASSAY OF PHOSPHORUS: CPT

## 2019-10-02 PROCEDURE — 99406 BEHAV CHNG SMOKING 3-10 MIN: CPT | Performed by: HOSPITALIST

## 2019-10-02 PROCEDURE — 96367 TX/PROPH/DG ADDL SEQ IV INF: CPT

## 2019-10-02 PROCEDURE — 700102 HCHG RX REV CODE 250 W/ 637 OVERRIDE(OP): Performed by: HOSPITALIST

## 2019-10-02 PROCEDURE — 87086 URINE CULTURE/COLONY COUNT: CPT

## 2019-10-02 PROCEDURE — 76705 ECHO EXAM OF ABDOMEN: CPT

## 2019-10-02 RX ORDER — ACETAMINOPHEN 325 MG/1
650 TABLET ORAL EVERY 6 HOURS PRN
Status: DISCONTINUED | OUTPATIENT
Start: 2019-10-02 | End: 2019-10-05 | Stop reason: HOSPADM

## 2019-10-02 RX ORDER — NICOTINE 21 MG/24HR
14 PATCH, TRANSDERMAL 24 HOURS TRANSDERMAL
Status: DISCONTINUED | OUTPATIENT
Start: 2019-10-02 | End: 2019-10-05 | Stop reason: HOSPADM

## 2019-10-02 RX ORDER — PROCHLORPERAZINE EDISYLATE 5 MG/ML
5-10 INJECTION INTRAMUSCULAR; INTRAVENOUS EVERY 4 HOURS PRN
Status: DISCONTINUED | OUTPATIENT
Start: 2019-10-02 | End: 2019-10-05 | Stop reason: HOSPADM

## 2019-10-02 RX ORDER — SODIUM CHLORIDE 9 MG/ML
1000 INJECTION, SOLUTION INTRAVENOUS ONCE
Status: COMPLETED | OUTPATIENT
Start: 2019-10-02 | End: 2019-10-02

## 2019-10-02 RX ORDER — ONDANSETRON 2 MG/ML
4 INJECTION INTRAMUSCULAR; INTRAVENOUS ONCE
Status: COMPLETED | OUTPATIENT
Start: 2019-10-02 | End: 2019-10-02

## 2019-10-02 RX ORDER — ONDANSETRON 4 MG/1
4 TABLET, ORALLY DISINTEGRATING ORAL EVERY 4 HOURS PRN
Status: DISCONTINUED | OUTPATIENT
Start: 2019-10-02 | End: 2019-10-05 | Stop reason: HOSPADM

## 2019-10-02 RX ORDER — TRAMADOL HYDROCHLORIDE 50 MG/1
50 TABLET ORAL EVERY 6 HOURS PRN
Status: DISCONTINUED | OUTPATIENT
Start: 2019-10-02 | End: 2019-10-04

## 2019-10-02 RX ORDER — PROMETHAZINE HYDROCHLORIDE 12.5 MG/1
12.5-25 SUPPOSITORY RECTAL EVERY 4 HOURS PRN
Status: DISCONTINUED | OUTPATIENT
Start: 2019-10-02 | End: 2019-10-05 | Stop reason: HOSPADM

## 2019-10-02 RX ORDER — PROMETHAZINE HYDROCHLORIDE 25 MG/1
12.5-25 TABLET ORAL EVERY 4 HOURS PRN
Status: DISCONTINUED | OUTPATIENT
Start: 2019-10-02 | End: 2019-10-05 | Stop reason: HOSPADM

## 2019-10-02 RX ORDER — ONDANSETRON 2 MG/ML
4 INJECTION INTRAMUSCULAR; INTRAVENOUS EVERY 4 HOURS PRN
Status: DISCONTINUED | OUTPATIENT
Start: 2019-10-02 | End: 2019-10-05 | Stop reason: HOSPADM

## 2019-10-02 RX ORDER — MORPHINE SULFATE 4 MG/ML
4 INJECTION, SOLUTION INTRAMUSCULAR; INTRAVENOUS ONCE
Status: COMPLETED | OUTPATIENT
Start: 2019-10-02 | End: 2019-10-02

## 2019-10-02 RX ADMIN — MORPHINE SULFATE 4 MG: 4 INJECTION INTRAVENOUS at 14:07

## 2019-10-02 RX ADMIN — IOHEXOL 100 ML: 350 INJECTION, SOLUTION INTRAVENOUS at 14:22

## 2019-10-02 RX ADMIN — METRONIDAZOLE 500 MG: 500 INJECTION, SOLUTION INTRAVENOUS at 22:46

## 2019-10-02 RX ADMIN — ONDANSETRON 4 MG: 2 INJECTION INTRAMUSCULAR; INTRAVENOUS at 14:07

## 2019-10-02 RX ADMIN — SODIUM CHLORIDE 1000 ML: 9 INJECTION, SOLUTION INTRAVENOUS at 14:07

## 2019-10-02 RX ADMIN — CEFTRIAXONE SODIUM 2 G: 2 INJECTION, POWDER, FOR SOLUTION INTRAMUSCULAR; INTRAVENOUS at 14:52

## 2019-10-02 RX ADMIN — ACETAMINOPHEN 650 MG: 325 TABLET, FILM COATED ORAL at 18:37

## 2019-10-02 RX ADMIN — TRAMADOL HYDROCHLORIDE 50 MG: 50 TABLET ORAL at 23:36

## 2019-10-02 RX ADMIN — METRONIDAZOLE 500 MG: 500 INJECTION, SOLUTION INTRAVENOUS at 18:36

## 2019-10-02 ASSESSMENT — LIFESTYLE VARIABLES
CONSUMPTION TOTAL: NEGATIVE
ON A TYPICAL DAY WHEN YOU DRINK ALCOHOL HOW MANY DRINKS DO YOU HAVE: 0
TOTAL SCORE: 0
AVERAGE NUMBER OF DAYS PER WEEK YOU HAVE A DRINK CONTAINING ALCOHOL: 0
EVER_SMOKED: YES
TOTAL SCORE: 0
SUBSTANCE_ABUSE: 0
HOW MANY TIMES IN THE PAST YEAR HAVE YOU HAD 5 OR MORE DRINKS IN A DAY: 0
TOTAL SCORE: 0
ALCOHOL_USE: NO
DOES PATIENT WANT TO STOP DRINKING: NO
HAVE YOU EVER FELT YOU SHOULD CUT DOWN ON YOUR DRINKING: NO
EVER HAD A DRINK FIRST THING IN THE MORNING TO STEADY YOUR NERVES TO GET RID OF A HANGOVER: NO
HAVE PEOPLE ANNOYED YOU BY CRITICIZING YOUR DRINKING: NO
EVER FELT BAD OR GUILTY ABOUT YOUR DRINKING: NO

## 2019-10-02 ASSESSMENT — COGNITIVE AND FUNCTIONAL STATUS - GENERAL
MOBILITY SCORE: 24
SUGGESTED CMS G CODE MODIFIER MOBILITY: CH
DAILY ACTIVITIY SCORE: 24
SUGGESTED CMS G CODE MODIFIER DAILY ACTIVITY: CH

## 2019-10-02 ASSESSMENT — ENCOUNTER SYMPTOMS
DEPRESSION: 0
SHORTNESS OF BREATH: 0
SPUTUM PRODUCTION: 0
POLYDIPSIA: 0
PALPITATIONS: 0
SORE THROAT: 0
FLANK PAIN: 0
STRIDOR: 0
CLAUDICATION: 0
WEAKNESS: 0
HEADACHES: 0
COUGH: 0
HALLUCINATIONS: 0
CONSTIPATION: 0
SINUS PAIN: 0
ORTHOPNEA: 0
BLURRED VISION: 0
BRUISES/BLEEDS EASILY: 0
HEMOPTYSIS: 0
DIARRHEA: 1
FALLS: 0
DOUBLE VISION: 0
HEARTBURN: 1
DIAPHORESIS: 0
MYALGIAS: 0
PHOTOPHOBIA: 0
TREMORS: 0
EYE PAIN: 0
WHEEZING: 0
NECK PAIN: 0
NAUSEA: 1
VOMITING: 1
DIZZINESS: 0
BACK PAIN: 0
CHILLS: 0
ABDOMINAL PAIN: 1
PND: 0
TINGLING: 0
FEVER: 0
BLOOD IN STOOL: 0

## 2019-10-02 ASSESSMENT — PATIENT HEALTH QUESTIONNAIRE - PHQ9
SUM OF ALL RESPONSES TO PHQ9 QUESTIONS 1 AND 2: 0
2. FEELING DOWN, DEPRESSED, IRRITABLE, OR HOPELESS: NOT AT ALL
1. LITTLE INTEREST OR PLEASURE IN DOING THINGS: NOT AT ALL

## 2019-10-02 ASSESSMENT — PAIN DESCRIPTION - DESCRIPTORS: DESCRIPTORS: CRAMPING;BURNING

## 2019-10-02 NOTE — ED NOTES
Med rec updated and complete. Allergies reviewed. Pt is not currently on any medications. No antibiotic use  In last 14 days. Home pharmacy [facility]  [place] .

## 2019-10-02 NOTE — ED PROVIDER NOTES
ED Provider Note    Scribed for Guero Rausch M.D. by Ashlyn Miranda. 10/2/2019, 11:10 AM.    Primary care provider: None noted  Means of arrival: Walk-In  History obtained from: Patient  History limited by: None    CHIEF COMPLAINT  Chief Complaint   Patient presents with   • Epigastric Pain     upper stomach woke pt up at apx 0300 radiates to back. intermittent.    • N/V       HPI  Brianna Ruby is a 24 y.o. female who presents to the Emergency Department for complaints of intermittent medial epigastric pain onset this morning at 3 am. She notes pain lasts for 1-2 minutes. Patient describes the quality as sharp. She states pain radiates to her should blades. Patient endorses associated nausea, vomiting, and diarrhea. Denies fever, chills, sore throat, cough, or difficulty urinating. Denies history of ovarian cysts or abdominal problems. Denies prior surgeries. Denies abnormal vaginal discharge or chance of STD. She notes she is allergic to sulfa drugs. Denies feeling similar symptoms prior.     REVIEW OF SYSTEMS  Pertinent positives include epigastric pain, nausea, vomiting, and diarrhea  Pertinent negatives include no fever, chills, sore throat, cough, difficulty urinating, or abnormal vaginal discharge.  All other systems reviewed and are negative.    PAST MEDICAL HISTORY   has a past medical history of ASTHMA (1996), GBS (group B Streptococcus carrier), +RV culture, currently pregnant (2/10/2014), Headache(784.0), Migraine, Strep throat, and Urinary tract infection, site not specified (8/4/13).    SURGICAL HISTORY  patient denies any surgical history    SOCIAL HISTORY  Social History     Tobacco Use   • Smoking status: Current Every Day Smoker     Packs/day: 0.25     Years: 6.00     Pack years: 1.50     Types: Cigarettes     Last attempt to quit: 8/1/2016     Years since quitting: 3.1   • Smokeless tobacco: Never Used   Substance Use Topics   • Alcohol use: No   • Drug use: Yes     Comment: THC     "  Social History     Substance and Sexual Activity   Drug Use Yes    Comment: THC       FAMILY HISTORY  Family History   Problem Relation Age of Onset   • Asthma Mother    • Heart Failure Father    • Diabetes Maternal Grandmother        CURRENT MEDICATIONS  Home Medications     Reviewed by Nubia Herman R.N. (Registered Nurse) on 10/02/19 at 1036  Med List Status: Complete   Medication Last Dose Status   albuterol 108 (90 Base) MCG/ACT Aero Soln inhalation aerosol prn Active                ALLERGIES  Allergies   Allergen Reactions   • Clindamycin Hives   • Sulfa Drugs        PHYSICAL EXAM  VITAL SIGNS: /80   Pulse 97   Temp 36.4 °C (97.5 °F) (Temporal)   Resp 18   Ht 1.676 m (5' 6\")   Wt 99 kg (218 lb 4.1 oz)   LMP 08/13/2019   SpO2 96%   BMI 35.23 kg/m²   Vitals reviewed.  Constitutional: Well developed, Well nourished, No acute distress, Non-toxic appearance.   HENT: Normocephalic, Atraumatic, Bilateral external ears normal, Oropharynx moist, No oral exudates, Nose normal.   Eyes: PERRL, EOMI, Conjunctiva normal, No discharge.   Neck: Normal range of motion, No tenderness, Supple, No stridor.   Cardiovascular: Normal heart rate, Normal rhythm, No murmurs, No rubs, No gallops.   Thorax & Lungs: Normal breath sounds, No respiratory distress, No wheezing,   Abdomen: Soft, normoactive bowel sounds.  Diffusely tender.  Most tender in the epigastric right upper quadrant and slightly right lower quadrant per  Skin: Warm, Dry, No erythema, No rash.   Back: No tenderness, No CVA tenderness.   Musculoskeletal: Good range of motion in all major joints. No edema. No tenderness to palpation or major deformities noted.   Neurologic: Alert, No focal deficits noted.   Psychiatric: Affect normal    LABS  Results for orders placed or performed during the hospital encounter of 10/02/19   CBC WITH DIFFERENTIAL   Result Value Ref Range    WBC 17.7 (H) 4.8 - 10.8 K/uL    RBC 5.22 4.20 - 5.40 M/uL    Hemoglobin 15.2 " 12.0 - 16.0 g/dL    Hematocrit 46.3 37.0 - 47.0 %    MCV 88.7 81.4 - 97.8 fL    MCH 29.1 27.0 - 33.0 pg    MCHC 32.8 (L) 33.6 - 35.0 g/dL    RDW 40.4 35.9 - 50.0 fL    Platelet Count 342 164 - 446 K/uL    MPV 9.0 9.0 - 12.9 fL    Neutrophils-Polys 85.90 (H) 44.00 - 72.00 %    Lymphocytes 7.10 (L) 22.00 - 41.00 %    Monocytes 4.60 0.00 - 13.40 %    Eosinophils 1.80 0.00 - 6.90 %    Basophils 0.30 0.00 - 1.80 %    Immature Granulocytes 0.30 0.00 - 0.90 %    Nucleated RBC 0.00 /100 WBC    Neutrophils (Absolute) 15.18 (H) 2.00 - 7.15 K/uL    Lymphs (Absolute) 1.26 1.00 - 4.80 K/uL    Monos (Absolute) 0.82 0.00 - 0.85 K/uL    Eos (Absolute) 0.31 0.00 - 0.51 K/uL    Baso (Absolute) 0.06 0.00 - 0.12 K/uL    Immature Granulocytes (abs) 0.05 0.00 - 0.11 K/uL    NRBC (Absolute) 0.00 K/uL   COMP METABOLIC PANEL   Result Value Ref Range    Sodium 141 135 - 145 mmol/L    Potassium 3.6 3.6 - 5.5 mmol/L    Chloride 111 96 - 112 mmol/L    Co2 23 20 - 33 mmol/L    Anion Gap 7.0 0.0 - 11.9    Glucose 106 (H) 65 - 99 mg/dL    Bun 13 8 - 22 mg/dL    Creatinine 0.69 0.50 - 1.40 mg/dL    Calcium 9.1 8.5 - 10.5 mg/dL    AST(SGOT) 12 12 - 45 U/L    ALT(SGPT) 11 2 - 50 U/L    Alkaline Phosphatase 64 30 - 99 U/L    Total Bilirubin 0.4 0.1 - 1.5 mg/dL    Albumin 4.3 3.2 - 4.9 g/dL    Total Protein 7.5 6.0 - 8.2 g/dL    Globulin 3.2 1.9 - 3.5 g/dL    A-G Ratio 1.3 g/dL   LIPASE   Result Value Ref Range    Lipase 15 11 - 82 U/L   URINALYSIS CULTURE, IF INDICATED   Result Value Ref Range    Color Yellow     Character Cloudy (A)     Specific Gravity 1.028 <1.035    Ph 5.5 5.0 - 8.0    Glucose Negative Negative mg/dL    Ketones Trace (A) Negative mg/dL    Protein Negative Negative mg/dL    Bilirubin Negative Negative    Urobilinogen, Urine 0.2 Negative    Nitrite Negative Negative    Leukocyte Esterase Small (A) Negative    Occult Blood Moderate (A) Negative    Micro Urine Req Microscopic    HCG QUAL SERUM   Result Value Ref Range    Beta-Hcg  Qualitative Serum Negative Negative   ESTIMATED GFR   Result Value Ref Range    GFR If African American >60 >60 mL/min/1.73 m 2    GFR If Non African American >60 >60 mL/min/1.73 m 2   URINE MICROSCOPIC (W/UA)   Result Value Ref Range    WBC 10-20 (A) /hpf    RBC 2-5 (A) /hpf    Bacteria Many (A) None /hpf    Epithelial Cells Many (A) /hpf    Hyaline Cast 11-20 (A) /lpf   EKG (NOW)   Result Value Ref Range    Report       [facility] Emergency Dept.    Test Date:  2019-10-02  Pt Name:    HARINDER BRADFORD               Department: ER  MRN:        [id number]                      Room:  Gender:     Female                       Technician: [id number]  :        1995                   Requested By:ER TRIAGE PROTOCOL  Order #:    [id number]                    Reading MD:    Measurements  Intervals                                Axis  Rate:       94                           P:          67  OR:         148                          QRS:        30  QRSD:       76                           T:          23  QT:         352  QTc:        441    Interpretive Statements  SINUS RHYTHM  Compared to ECG 2017 21:19:18  No significant changes        All labs reviewed by me.    RADIOLOGY  CT-ABDOMEN-PELVIS WITH   Final Result      1.  Thickened loops of small intestine within the abdomen inferiorly. This primarily involves the distal ileum. Differential diagnosis includes enteritis as well as inflammatory bowel disease.      2.  3.3 cm left ovarian cyst with a small amount of free fluid dependently within the pelvis.      US-RUQ   Final Result      1.  Contracted gallbladder. No evidence of gallstone or biliary ductal dilatation.      2.  Possible medullary sponge kidney.        The radiologist's interpretation of all radiological studies have been reviewed by me.    EKG  Interpreted by me as shown above.     COURSE & MEDICAL DECISION MAKING  Pertinent Labs & Imaging studies reviewed. (See chart for  details)    11:10 AM Patient seen and examined at bedside. The patient presents with epigastric pain, and the differential diagnosis includes but is not limited to cholecystitis, cholelithiasis, pancreatitis, perforated peptic ulcer, less likely colitis or appendicitis we also considered gastroenteritis per ordered for US-RUQ, CBC with differential, CMP, lipase, UA, HCQ qual, and EKG to evaluate.      2:04 PM - Patient was reevaluated at bedside. Discussed lab and radiology results with the patient and informed them of further plan for care. I will treat patient with morphine 4 mg, Zofran 4 mg, and NS infusion 1,000 mL at this time.     Patient was given IV fluid hydration for decreased oral intake, active vomiting and diarrhea..  She is unable to tolerate oral fluids because of her vomiting.  She is reassessed after fluids and is improved.    The patient's CT scan shows some significantly inflamed and dilated loops of small bowel.  The etiology of this is unclear.  I met his ESR and CRP.  Repeat exam shows a benign abdomen without tenderness or peritonitis.  Appendix appears normal.      3:50 PM - I discussed the patient's case and the above findings with Dr. Duque (Hospitalist) who agrees to admit the patient at this time.      HYDRATION: Based on the patient's presentation of Acute Vomiting the patient was given IV fluids. IV Hydration was used because oral hydration was not adequate alone. Upon recheck following hydration, the patient was slightly improved.      Patient aware the plan.  Should be admitted for continued work-up and treatment because the abnormal findings of her CT.  Is admitted in fair condition    DISPOSITION:  Patient will be admitted to Dr. Duque in guarded condition.     FINAL IMPRESSION  1. Generalized abdominal pain    2. Non-intractable vomiting with nausea, unspecified vomiting type          I, Ashlyn Miranda (Scribe), am scribing for, and in the presence of, Guero Rausch,  M.D..    Electronically signed by: Ashlyn Miranda (Scribe), 10/2/2019    IGuero M.D. personally performed the services described in this documentation, as scribed by Ashlyn Miranda in my presence, and it is both accurate and complete. C    The note accurately reflects work and decisions made by me.  Guero Rausch  10/2/2019  5:07 PM

## 2019-10-02 NOTE — ED TRIAGE NOTES
.  Chief Complaint   Patient presents with   • Epigastric Pain     upper stomach woke pt up at apx 0300 radiates to back. intermittent.    • N/V   ambulated to triage with above c/c. ekg paged

## 2019-10-03 ENCOUNTER — PATIENT OUTREACH (OUTPATIENT)
Dept: HEALTH INFORMATION MANAGEMENT | Facility: OTHER | Age: 24
End: 2019-10-03

## 2019-10-03 ENCOUNTER — APPOINTMENT (OUTPATIENT)
Dept: RADIOLOGY | Facility: MEDICAL CENTER | Age: 24
End: 2019-10-03
Attending: STUDENT IN AN ORGANIZED HEALTH CARE EDUCATION/TRAINING PROGRAM
Payer: MEDICAID

## 2019-10-03 PROBLEM — K52.9 ENTERITIS: Status: ACTIVE | Noted: 2019-10-03

## 2019-10-03 PROBLEM — F17.200 TOBACCO DEPENDENCE: Status: ACTIVE | Noted: 2019-10-03

## 2019-10-03 LAB
ALBUMIN SERPL BCP-MCNC: 3.5 G/DL (ref 3.2–4.9)
ALBUMIN/GLOB SERPL: 1.2 G/DL
ALP SERPL-CCNC: 56 U/L (ref 30–99)
ALT SERPL-CCNC: 10 U/L (ref 2–50)
ANION GAP SERPL CALC-SCNC: 5 MMOL/L (ref 0–11.9)
AST SERPL-CCNC: 10 U/L (ref 12–45)
BASOPHILS # BLD AUTO: 0.5 % (ref 0–1.8)
BASOPHILS # BLD: 0.05 K/UL (ref 0–0.12)
BILIRUB SERPL-MCNC: 0.4 MG/DL (ref 0.1–1.5)
BUN SERPL-MCNC: 9 MG/DL (ref 8–22)
C DIFF DNA SPEC QL NAA+PROBE: NEGATIVE
C DIFF TOX GENS STL QL NAA+PROBE: NEGATIVE
CALCIUM SERPL-MCNC: 8.6 MG/DL (ref 8.5–10.5)
CHLORIDE SERPL-SCNC: 109 MMOL/L (ref 96–112)
CO2 SERPL-SCNC: 23 MMOL/L (ref 20–33)
CREAT SERPL-MCNC: 0.67 MG/DL (ref 0.5–1.4)
EOSINOPHIL # BLD AUTO: 0.39 K/UL (ref 0–0.51)
EOSINOPHIL NFR BLD: 3.8 % (ref 0–6.9)
ERYTHROCYTE [DISTWIDTH] IN BLOOD BY AUTOMATED COUNT: 40.6 FL (ref 35.9–50)
GLOBULIN SER CALC-MCNC: 2.9 G/DL (ref 1.9–3.5)
GLUCOSE SERPL-MCNC: 96 MG/DL (ref 65–99)
HCT VFR BLD AUTO: 39 % (ref 37–47)
HGB BLD-MCNC: 12.8 G/DL (ref 12–16)
IMM GRANULOCYTES # BLD AUTO: 0.03 K/UL (ref 0–0.11)
IMM GRANULOCYTES NFR BLD AUTO: 0.3 % (ref 0–0.9)
LYMPHOCYTES # BLD AUTO: 2.42 K/UL (ref 1–4.8)
LYMPHOCYTES NFR BLD: 23.7 % (ref 22–41)
MCH RBC QN AUTO: 29.4 PG (ref 27–33)
MCHC RBC AUTO-ENTMCNC: 32.8 G/DL (ref 33.6–35)
MCV RBC AUTO: 89.7 FL (ref 81.4–97.8)
MONOCYTES # BLD AUTO: 0.82 K/UL (ref 0–0.85)
MONOCYTES NFR BLD AUTO: 8 % (ref 0–13.4)
NEUTROPHILS # BLD AUTO: 6.52 K/UL (ref 2–7.15)
NEUTROPHILS NFR BLD: 63.7 % (ref 44–72)
NRBC # BLD AUTO: 0 K/UL
NRBC BLD-RTO: 0 /100 WBC
PLATELET # BLD AUTO: 258 K/UL (ref 164–446)
PMV BLD AUTO: 9.2 FL (ref 9–12.9)
POTASSIUM SERPL-SCNC: 3.6 MMOL/L (ref 3.6–5.5)
PROT SERPL-MCNC: 6.4 G/DL (ref 6–8.2)
RBC # BLD AUTO: 4.35 M/UL (ref 4.2–5.4)
SODIUM SERPL-SCNC: 137 MMOL/L (ref 135–145)
T3FREE SERPL-MCNC: 3.22 PG/ML (ref 2.4–4.2)
T4 FREE SERPL-MCNC: 0.9 NG/DL (ref 0.53–1.43)
THYROPEROXIDASE AB SERPL-ACNC: 0.5 IU/ML (ref 0–9)
WBC # BLD AUTO: 10.2 K/UL (ref 4.8–10.8)
WBC STL QL MICRO: NORMAL

## 2019-10-03 PROCEDURE — 83993 ASSAY FOR CALPROTECTIN FECAL: CPT

## 2019-10-03 PROCEDURE — 700101 HCHG RX REV CODE 250: Performed by: HOSPITALIST

## 2019-10-03 PROCEDURE — A9270 NON-COVERED ITEM OR SERVICE: HCPCS | Performed by: HOSPITALIST

## 2019-10-03 PROCEDURE — 89055 LEUKOCYTE ASSESSMENT FECAL: CPT

## 2019-10-03 PROCEDURE — 87046 STOOL CULTR AEROBIC BACT EA: CPT | Mod: 91

## 2019-10-03 PROCEDURE — 84481 FREE ASSAY (FT-3): CPT

## 2019-10-03 PROCEDURE — 86376 MICROSOMAL ANTIBODY EACH: CPT

## 2019-10-03 PROCEDURE — 86800 THYROGLOBULIN ANTIBODY: CPT

## 2019-10-03 PROCEDURE — 84442 ASSAY OF THYROID ACTIVITY: CPT

## 2019-10-03 PROCEDURE — 85025 COMPLETE CBC W/AUTO DIFF WBC: CPT

## 2019-10-03 PROCEDURE — 87328 CRYPTOSPORIDIUM AG IA: CPT

## 2019-10-03 PROCEDURE — 96366 THER/PROPH/DIAG IV INF ADDON: CPT

## 2019-10-03 PROCEDURE — 87493 C DIFF AMPLIFIED PROBE: CPT

## 2019-10-03 PROCEDURE — 84432 ASSAY OF THYROGLOBULIN: CPT

## 2019-10-03 PROCEDURE — 87329 GIARDIA AG IA: CPT

## 2019-10-03 PROCEDURE — G0378 HOSPITAL OBSERVATION PER HR: HCPCS

## 2019-10-03 PROCEDURE — 700111 HCHG RX REV CODE 636 W/ 250 OVERRIDE (IP): Performed by: HOSPITALIST

## 2019-10-03 PROCEDURE — 96367 TX/PROPH/DG ADDL SEQ IV INF: CPT

## 2019-10-03 PROCEDURE — 700102 HCHG RX REV CODE 250 W/ 637 OVERRIDE(OP): Performed by: HOSPITALIST

## 2019-10-03 PROCEDURE — 76536 US EXAM OF HEAD AND NECK: CPT

## 2019-10-03 PROCEDURE — 36415 COLL VENOUS BLD VENIPUNCTURE: CPT

## 2019-10-03 PROCEDURE — 80053 COMPREHEN METABOLIC PANEL: CPT

## 2019-10-03 PROCEDURE — 87045 FECES CULTURE AEROBIC BACT: CPT | Mod: 91

## 2019-10-03 PROCEDURE — 84439 ASSAY OF FREE THYROXINE: CPT

## 2019-10-03 PROCEDURE — 99225 PR SUBSEQUENT OBSERVATION CARE,LEVEL II: CPT | Performed by: STUDENT IN AN ORGANIZED HEALTH CARE EDUCATION/TRAINING PROGRAM

## 2019-10-03 PROCEDURE — 84479 ASSAY OF THYROID (T3 OR T4): CPT

## 2019-10-03 RX ADMIN — TRAMADOL HYDROCHLORIDE 50 MG: 50 TABLET ORAL at 18:12

## 2019-10-03 RX ADMIN — METRONIDAZOLE 500 MG: 500 INJECTION, SOLUTION INTRAVENOUS at 06:25

## 2019-10-03 RX ADMIN — TRAMADOL HYDROCHLORIDE 50 MG: 50 TABLET ORAL at 06:22

## 2019-10-03 RX ADMIN — ONDANSETRON 4 MG: 4 TABLET, ORALLY DISINTEGRATING ORAL at 10:00

## 2019-10-03 RX ADMIN — ACETAMINOPHEN 650 MG: 325 TABLET, FILM COATED ORAL at 22:13

## 2019-10-03 NOTE — PROGRESS NOTES
Paged MD Krystle Duque regarding patient's uncontrolled pain with tylenol. MD will put in orders for patient.

## 2019-10-03 NOTE — H&P
Hospital Medicine History & Physical Note    Date of Service  10/2/2019    Primary Care Physician  Pcp Pt States None    Consultants  None    Code Status  Full    Chief Complaint  Abdomen pain    History of Presenting Illness  24 y.o. female who presented 10/2/2019 with past medical history of eclampsia, group B strep comes into the emergency room with complaints of epigastric abdominal pain that is been present for the past year.  The pain she describes as a colicky sharp pain that is intermittent.  It radiates to her shoulder blades.  She is been worked up in the past with abdominal ultrasounds and PPI trials that were not effective.  It is associated with nausea vomiting and diarrhea.  The diarrhea is only been present for 1 day and she has had 5 watery stools without mucus.  Patient denies any travel history, sick exposures or history of autoimmune disease.  Upon arrival to emergency room her vitals were within normal limits.  Right upper quadrant ultrasound was negative for any acute findings  CT scan found evidence of enteritis.  EKG interpreted by me found normal sinus rhythm with no ST segment changes    Review of Systems  Review of Systems   Constitutional: Negative for chills, diaphoresis, fever and malaise/fatigue.   HENT: Negative for congestion, ear discharge, ear pain, hearing loss, nosebleeds, sinus pain, sore throat and tinnitus.    Eyes: Negative for blurred vision, double vision, photophobia and pain.   Respiratory: Negative for cough, hemoptysis, sputum production, shortness of breath, wheezing and stridor.    Cardiovascular: Negative for chest pain, palpitations, orthopnea, claudication, leg swelling and PND.   Gastrointestinal: Positive for abdominal pain, diarrhea, heartburn, nausea and vomiting. Negative for blood in stool, constipation and melena.   Genitourinary: Negative for dysuria, flank pain, frequency, hematuria and urgency.   Musculoskeletal: Negative for back pain, falls, joint pain,  myalgias and neck pain.   Skin: Negative for itching and rash.   Neurological: Negative for dizziness, tingling, tremors, weakness and headaches.   Endo/Heme/Allergies: Negative for environmental allergies and polydipsia. Does not bruise/bleed easily.   Psychiatric/Behavioral: Negative for depression, hallucinations, substance abuse and suicidal ideas.       Past Medical History   has a past medical history of ASTHMA (1996), Enteritis (10/3/2019), GBS (group B Streptococcus carrier), +RV culture, currently pregnant (2/10/2014), Headache(784.0), Migraine, Strep throat, and Urinary tract infection, site not specified (8/4/13). She also has no past medical history of Addisons disease ([facility]), Adrenal disorder (HCC), Allergy, Anemia, Anxiety, Arrhythmia, Arthritis, Asymptomatic human immunodeficiency virus (HIV) infection status ([facility]), Blood transfusion, CATARACT, Clotting disorder ([facility]), Cushings syndrome ([facility]), EMPHYSEMA, GERD (gastroesophageal reflux disease), Glaucoma, Goiter, Heart attack ([facility]), Heart murmur, HIV (human immunodeficiency virus infection), Hyperlipidemia, Meningitis, Muscle disorder, OSTEOPOROSIS, Osteoporosis, unspecified, Parathyroid disorder (HCC), Pituitary disease (HCC), Seizure ([facility]), Sickle cell disease (HCC), Substance abuse (HCC), Thyroid disease, Tuberculosis, Ulcer, or Unspecified cataract.    Surgical History  Surgical history reviewed and not pertinent    Family History  family history includes Asthma in her mother; Diabetes in her maternal grandmother; Heart Failure in her father.     Social History   reports that she has been smoking cigarettes. She has a 1.50 pack-year smoking history. She has never used smokeless tobacco. She reports that she has current or past drug history. She reports that she does not drink alcohol.    Allergies  Allergies   Allergen Reactions   • Clindamycin Hives   • Sulfa Drugs        Medications  None       Physical Exam  Temp:  [36.4 °C (97.5 °F)-37.2 °C (99  °F)] 37 °C (98.6 °F)  Pulse:  [83-99] 92  Resp:  [16-18] 18  BP: (111-140)/(66-92) 122/75  SpO2:  [93 %-99 %] 95 %    Physical Exam   Constitutional: She is oriented to person, place, and time. She appears well-developed and well-nourished.   HENT:   Head: Normocephalic and atraumatic.   Mouth/Throat: No oropharyngeal exudate.   Eyes: Pupils are equal, round, and reactive to light. EOM are normal. No scleral icterus.   Neck: Normal range of motion. Neck supple. No JVD present. No tracheal deviation present. No thyromegaly present.   Cardiovascular: Normal rate, regular rhythm and intact distal pulses. Exam reveals no gallop and no friction rub.   No murmur heard.  Pulmonary/Chest: Effort normal and breath sounds normal. No stridor. No respiratory distress. She has no wheezes. She has no rales. She exhibits no tenderness.   Abdominal: Soft. Bowel sounds are normal. She exhibits no distension and no mass. There is tenderness. There is no rebound and no guarding.   Musculoskeletal: Normal range of motion. She exhibits no edema.   Lymphadenopathy:     She has no cervical adenopathy.   Neurological: She is alert and oriented to person, place, and time. She has normal reflexes. No cranial nerve deficit.   Skin: No rash noted. No erythema. No pallor.   Nursing note and vitals reviewed.      Laboratory:  Recent Labs     10/02/19  1155   WBC 17.7*   RBC 5.22   HEMOGLOBIN 15.2   HEMATOCRIT 46.3   MCV 88.7   MCH 29.1   MCHC 32.8*   RDW 40.4   PLATELETCT 342   MPV 9.0     Recent Labs     10/02/19  1155   SODIUM 141   POTASSIUM 3.6   CHLORIDE 111   CO2 23   GLUCOSE 106*   BUN 13   CREATININE 0.69   CALCIUM 9.1     Recent Labs     10/02/19  1155   ALTSGPT 11   ASTSGOT 12   ALKPHOSPHAT 64   TBILIRUBIN 0.4   LIPASE 15   GLUCOSE 106*         No results for input(s): NTPROBNP in the last 72 hours.      No results for input(s): TROPONINT in the last 72 hours.    Urinalysis:    Recent Labs     10/02/19  1300   SPECGRAVITY 1.028    GLUCOSEUR Negative   KETONES Trace*   NITRITE Negative   LEUKESTERAS Small*   WBCURINE 10-20*   RBCURINE 2-5*   BACTERIA Many*   EPITHELCELL Many*        Imaging:  CT-ABDOMEN-PELVIS WITH   Final Result      1.  Thickened loops of small intestine within the abdomen inferiorly. This primarily involves the distal ileum. Differential diagnosis includes enteritis as well as inflammatory bowel disease.      2.  3.3 cm left ovarian cyst with a small amount of free fluid dependently within the pelvis.      US-RUQ   Final Result      1.  Contracted gallbladder. No evidence of gallstone or biliary ductal dilatation.      2.  Possible medullary sponge kidney.            Assessment/Plan:  I anticipate this patient is appropriate for observation status at this time.    * Enteritis  Assessment & Plan  Epigastric abdominal pain and evidence of inflammation of small bowel on CT scan  Stool studies ordered  C. difficile is been ordered  ESR slightly elevated but CRP is normal   Pain control with oral and IV pain medications will have to monitor her mental respiratory status closely  Patient was started on IV Flagyl de-escalate as appropriate    Tobacco dependence  Assessment & Plan  Tobacco cessation education provided for more than 3 minutes, discussed options of nicotine patch, medical treatment with wellbutrin and chantix. Discussed the risks of smoking including increased risk of heart disease, stroke, cancer and COPD. Discussed the benefits of quitting smoking. Nicotine replacement protocol will be provided to the patient.      Obese- (present on admission)  Assessment & Plan  Patient has been counseled on diet and lifestyle modifications  Recommended outpatient weight management program and bariatric surgery evaluation  Pt educated on the increase of morbidity and mortality associated with excess weight including DM, Heart Disease, HTN, stroke, and sleep apnea.  Pt advised weight loss of 5% through reduced calorie, low carb  diet and 150 mins of exercise a week        VTE prophylaxis: SCD

## 2019-10-03 NOTE — CARE PLAN
Problem: Communication  Goal: The ability to communicate needs accurately and effectively will improve  Outcome: PROGRESSING AS EXPECTED  Note:   Educated patient on the use of call light for assistance. Went over controls and functions on the remote. Answered any questions patient had. Patient has been calling appropriately.      Problem: Knowledge Deficit  Goal: Knowledge of disease process/condition, treatment plan, diagnostic tests, and medications will improve  Outcome: PROGRESSING AS EXPECTED  Note:   Went over plan of care with patient and answered any questions patient had.

## 2019-10-03 NOTE — PROGRESS NOTES
Assumed care at 0700, report received from NOC RN.  Pt A&O x 4, states pain is 6/10--heat pack offered. Bed locked, 2 rails up, bed in lowest position. Call light in place, belongings at bedside, no needs at this time and hourly rounding in place. All questions answered at this time.

## 2019-10-03 NOTE — PROGRESS NOTES
[facility] Medicine Daily Progress Note    Date of Service: 10/3/2019 Code Status: Full Code     Chief Complaint  Chief Complaint   Patient presents with   • Epigastric Pain     upper stomach woke pt up at apx 0300 radiates to back. intermittent.    • N/V       Consultants/Specialty: None Disposition: Remain IP     Hospital Course     ER and Admission Day course  24 y.o. female who presented 10/2/2019 with past medical history of eclampsia, group B strep comes into the emergency room with complaints of epigastric abdominal pain that is been present for the past year.  The pain she describes as a colicky sharp pain that is intermittent.  It radiates to her shoulder blades.  She is been worked up in the past with abdominal ultrasounds and PPI trials that were not effective.  It is associated with nausea vomiting and diarrhea.  The diarrhea is only been present for 1 day and she has had 5 watery stools without mucus.  Patient denies any travel history, sick exposures or history of autoimmune disease.  Upon arrival to emergency room her vitals were within normal limits.  Right upper quadrant ultrasound was negative for any acute findings  CT scan found evidence of enteritis.  EKG interpreted by me found normal sinus rhythm with no ST segment changes  10/3 - TSH suppressed, thyroid work-up, brief discussion with GI, wouldn't scope with a thyroid in that condition.       Interval Problem Update  Patient was seen and evaluated today for epigastric pain      Review of Systems  Review of Systems   Constitutional: Positive for malaise/fatigue. Negative for fever.   HENT: Negative for sore throat.    Cardiovascular: Negative for chest pain and palpitations.   Gastrointestinal: Positive for abdominal pain, diarrhea and nausea. Negative for blood in stool, constipation and vomiting.   Skin: Negative for itching and rash.   Psychiatric/Behavioral: Negative for depression. The patient is not nervous/anxious.     Physical  Exam  Physical Exam   Constitutional: She is oriented to person, place, and time. No distress.   Neck:   Difficult thyroid exam with body habitus, no lymphadenopathy   Cardiovascular: Regular rhythm.   No murmur heard.  Abdominal: She exhibits no distension. There is no tenderness.   Musculoskeletal: She exhibits no edema or deformity.   Neurological: She is alert and oriented to person, place, and time.   Skin: Skin is warm and dry. She is not diaphoretic.   Psychiatric: She has a normal mood and affect. Her behavior is normal. Judgment and thought content normal.   Nursing note and vitals reviewed.       I/Os    Intake/Output Summary (Last 24 hours) at 10/3/2019 0933  Last data filed at 10/2/2019 1654  Gross per 24 hour   Intake 1100 ml   Output 150 ml   Net 950 ml    Vital Signs  Temp:  [36.4 °C (97.5 °F)-37.2 °C (99 °F)] 36.6 °C (97.8 °F)  Pulse:  [71-99] 75  Resp:  [16-18] 16  BP: (111-140)/(61-92) 126/70  SpO2:  [93 %-99 %] 98 %     Laboratory  Results from last 7 days   Lab Units 10/03/19  0233 10/02/19  1155   WBC 1501 K/uL 10.2 17.7*   HGB 1503 g/dL 12.8 15.2   HCT 1504 % 39.0 46.3   PLATELET COUNT 1518 K/uL 258 342    Results from last 7 days   Lab Units 10/03/19  0233 10/02/19  1759 10/02/19  1155   SODIUM 101 mmol/L 137  --  141   POTASSIUM 102 mmol/L 3.6  --  3.6   CHLORIDE 103 mmol/L 109  --  111   CO2 104 mmol/L 23  --  23   ANION GAP ANION  5.0  --  7.0    mg/dL 9  --  13   CREATININE 109 mg/dL 0.67  --  0.69   CALCIUM 105 mg/dL 8.6  --  9.1   GLUCOSE 112 mg/dL 96  --  106*   IF DANIELA AMER 356614 mL/min/1.73 m 2 >60  --  >60   IF NON AFRICAN AMER 109GFRB mL/min/1.73 m 2 >60  --  >60   MAGNESIUM 561 mg/dL  --  1.5  --            Medications    Scheduled medications:  nicotine 14 mg Transdermal Daily-0600   metroNIDAZOLE (FLAGYL)  mg Intravenous Q8HRS   Pharmacy 1 Each Other PHARMACY TO DOSE        Medications were reviewed today.      Assessment/Plan  * Enteritis- (present on  admission)  Assessment & Plan  10/3 - discussion with GI for calprotectin for inflammatory response, mildly elevated ESR.  Will hold for formal consultation pending results of thyroid work-up.  10/2 - adm - Epigastric abdominal pain and evidence of inflammation of small bowel on CT scan. Stool studies ordered  C. difficile is been ordered. ESR slightly elevated but CRP is normal. Pain control with oral and IV pain medications will have to monitor her mental respiratory status closely  Patient was started on IV Flagyl de-escalate as appropriate    Hyperthyroidism- (present on admission)  Assessment & Plan  10/3 - TSH on admission suppressed, no hx of thyroid disease, ordered thyoid work-up and ultrasound.  Suspicion for this as cause of the symptoms.  Possibly Hashimoto vs thyroid cancer.    Tobacco dependence- (present on admission)  Assessment & Plan  10/3 - stable  10/2 - adm - Tobacco cessation education provided for more than 3 minutes, discussed options of nicotine patch, medical treatment with wellbutrin and chantix. Discussed the risks of smoking including increased risk of heart disease, stroke, cancer and COPD. Discussed the benefits of quitting smoking. Nicotine replacement protocol will be provided to the patient.      Obese- (present on admission)  Assessment & Plan  10/3 - stable  10/2 - adm - Patient has been counseled on diet and lifestyle modifications  Recommended outpatient weight management program and bariatric surgery evaluation  Pt educated on the increase of morbidity and mortality associated with excess weight including DM, Heart Disease, HTN, stroke, and sleep apnea.  Pt advised weight loss of 5% through reduced calorie, low carb diet and 150 mins of exercise a week         VTE prophylaxis: scd    Imaging  US-SOFT TISSUES OF HEAD - NECK   Final Result         1.  Right thyroid mass, highly suspicious.      ACR TI-RADS Recommendations:  TR5 - FNA recommended      Recommendations based on the  American College of Radiology Thyroid imaging, reporting and Data System (TI-RADS) 2017.      These findings were discussed with the patient's clinician, Wilbur Tavares, on 10/4/2019 7:37 AM.      CT-ABDOMEN-PELVIS WITH   Final Result      1.  Thickened loops of small intestine within the abdomen inferiorly. This primarily involves the distal ileum. Differential diagnosis includes enteritis as well as inflammatory bowel disease.      2.  3.3 cm left ovarian cyst with a small amount of free fluid dependently within the pelvis.      US-RUQ   Final Result      1.  Contracted gallbladder. No evidence of gallstone or biliary ductal dilatation.      2.  Possible medullary sponge kidney.

## 2019-10-03 NOTE — CARE PLAN
Problem: Bowel/Gastric:  Goal: Will not experience complications related to bowel motility  Outcome: PROGRESSING AS EXPECTED  Note:   Pt having more regular bm's. Pt was experiencing nausea this am--ondansetron given and pt has no further complaints.      Problem: Knowledge Deficit  Goal: Knowledge of disease process/condition, treatment plan, diagnostic tests, and medications will improve  Outcome: PROGRESSING AS EXPECTED  Note:   MD at bedside to explain plan of care and lab work to be drawn. Pt more at ease knowing plan of care and a more direct approach to explanation. Pt appreciative to MD regarding thorough answers.

## 2019-10-03 NOTE — PROGRESS NOTES
Assumed care of patient at 1900. Received bedside report from day RN. Patient resting comfortably in bed. No signs of distress. Bed is in low and locked position. Non-slip socks are in place. Call light is within reach. Isolation precautions are in place.

## 2019-10-03 NOTE — ASSESSMENT & PLAN NOTE
10/3-10/5 - stable  10/2 - adm - Patient has been counseled on diet and lifestyle modifications  Recommended outpatient weight management program and bariatric surgery evaluation  Pt educated on the increase of morbidity and mortality associated with excess weight including DM, Heart Disease, HTN, stroke, and sleep apnea.  Pt advised weight loss of 5% through reduced calorie, low carb diet and 150 mins of exercise a week

## 2019-10-03 NOTE — PROGRESS NOTES
· 2 RN skin check completed with  RN  · Devices in place none.  · Skin assessed under devices n/a.  · Confirmed pressure ulcers found on n/a.  · New potential pressure ulcers noted on n/a. Wound consult placed? n/a. Photo uploaded? n/a.   · The following interventions are in place education to turn self since patient is ambulatory and extra pillows for repositioining.

## 2019-10-03 NOTE — DISCHARGE PLANNING
Patient is eligible for Medicaid Meds to Beds at discharge. Preferred pharmacy changed to [facility]. Please call [phone] prior to discharge.

## 2019-10-03 NOTE — DISCHARGE PLANNING
Anticipated Discharge Disposition: Home    Action: LSW met with pt at bedside to complete assessment. Pt verified Facesheet. No AD on file, pt refused packet. Pt lives with  and 2 kids. No financial concerns. Pharmacy: [facility] on [address]. No PCP, LSW requested scheduling to add instructions to establish to AVS. LSW provided MTM brochure to pt and explained how to use benefit. No additional questions or concerns regarding discharge.     Barriers to Discharge: None    Plan: No additional discharge needs identified at this time.       Care Transition Team Assessment    Information Source  Orientation : Oriented x 4  Information Given By: Patient  Who is responsible for making decisions for patient? : Patient         Elopement Risk  Legal Hold: No  Elopement Risk: Not at Risk for Elopement    Interdisciplinary Discharge Planning  Lives with - Patient's Self Care Capacity: Spouse  Patient or legal guardian wants to designate a caregiver (see row info): No  Support Systems: Family Member(s)  Durable Medical Equipment: Not Applicable    Discharge Preparedness  What is your plan after discharge?: Home with help  What are your discharge supports?: Spouse, Partner  Prior Functional Level: Ambulatory, Drives Self, Independent with Activities of Daily Living, Independent with Medication Management  Difficulity with ADLs: None  Difficulity with IADLs: None    Functional Assesment  Prior Functional Level: Ambulatory, Drives Self, Independent with Activities of Daily Living, Independent with Medication Management    Finances  Financial Barriers to Discharge: No  Prescription Coverage: Yes    Vision / Hearing Impairment  Vision Impairment : Yes  Right Eye Vision: Wears Glasses  Left Eye Vision: Wears Glasses  Hearing Impairment : No         Advance Directive  Advance Directive?: None  Advance Directive offered?: AD Booklet refused    Domestic Abuse  Have you ever been the victim of abuse or violence?: No  Physical Abuse or Sexual  Abuse: No  Verbal Abuse or Emotional Abuse: No  Possible Abuse Reported to:: Not Applicable    Psychological Assessment  History of Substance Abuse: None  History of Psychiatric Problems: No  Non-compliant with Treatment: No  Newly Diagnosed Illness: No    Discharge Risks or Barriers  Discharge risks or barriers?: No PCP  Patient risk factors: No PCP    Anticipated Discharge Information  Anticipated discharge disposition: Home

## 2019-10-03 NOTE — ASSESSMENT & PLAN NOTE
10/5 - Celiac panel came back + of Immunoglobulin A of 424.  Patient consumes substantial amount of wheat products, struggles with seasonal allergies.  Very long discussion of possible celiac disease vs allergic to grains and refraining completely from any grains in her diet.  Patient feels well enough to go home.  10/4 - abdominal pain not particularly changed, continued clear liquids, patient does smoke marijuana daily. Awaiting further results.   10/3 - discussion with GI for calprotectin for inflammatory response, mildly elevated ESR.  Will hold for formal consultation pending results of thyroid work-up.  10/2 - adm - Epigastric abdominal pain and evidence of inflammation of small bowel on CT scan. Stool studies ordered  C. difficile is been ordered. ESR slightly elevated but CRP is normal. Pain control with oral and IV pain medications will have to monitor her mental respiratory status closely  Patient was started on IV Flagyl de-escalate as appropriate

## 2019-10-03 NOTE — ASSESSMENT & PLAN NOTE
10/3-10/5 - stable  10/2 - adm - Tobacco cessation education provided for more than 3 minutes, discussed options of nicotine patch, medical treatment with wellbutrin and chantix. Discussed the risks of smoking including increased risk of heart disease, stroke, cancer and COPD. Discussed the benefits of quitting smoking. Nicotine replacement protocol will be provided to the patient.

## 2019-10-04 ENCOUNTER — APPOINTMENT (OUTPATIENT)
Dept: RADIOLOGY | Facility: MEDICAL CENTER | Age: 24
End: 2019-10-04
Attending: STUDENT IN AN ORGANIZED HEALTH CARE EDUCATION/TRAINING PROGRAM
Payer: MEDICAID

## 2019-10-04 PROBLEM — E05.90 HYPERTHYROIDISM: Status: ACTIVE | Noted: 2019-10-04

## 2019-10-04 LAB
BACTERIA UR CULT: NORMAL
CYTOLOGY REG CYTOL: NORMAL
G LAMBLIA+C PARVUM AG STL QL RAPID: NORMAL
SIGNIFICANT IND 70042: NORMAL
SIGNIFICANT IND 70042: NORMAL
SITE SITE: NORMAL
SITE SITE: NORMAL
SOURCE SOURCE: NORMAL
SOURCE SOURCE: NORMAL

## 2019-10-04 PROCEDURE — 96366 THER/PROPH/DIAG IV INF ADDON: CPT

## 2019-10-04 PROCEDURE — G0378 HOSPITAL OBSERVATION PER HR: HCPCS

## 2019-10-04 PROCEDURE — 700105 HCHG RX REV CODE 258: Performed by: STUDENT IN AN ORGANIZED HEALTH CARE EDUCATION/TRAINING PROGRAM

## 2019-10-04 PROCEDURE — 88112 CYTOPATH CELL ENHANCE TECH: CPT

## 2019-10-04 PROCEDURE — 99225 PR SUBSEQUENT OBSERVATION CARE,LEVEL II: CPT | Performed by: STUDENT IN AN ORGANIZED HEALTH CARE EDUCATION/TRAINING PROGRAM

## 2019-10-04 PROCEDURE — A9270 NON-COVERED ITEM OR SERVICE: HCPCS | Performed by: HOSPITALIST

## 2019-10-04 PROCEDURE — 76942 ECHO GUIDE FOR BIOPSY: CPT

## 2019-10-04 PROCEDURE — 700102 HCHG RX REV CODE 250 W/ 637 OVERRIDE(OP): Performed by: INTERNAL MEDICINE

## 2019-10-04 PROCEDURE — 700111 HCHG RX REV CODE 636 W/ 250 OVERRIDE (IP): Performed by: STUDENT IN AN ORGANIZED HEALTH CARE EDUCATION/TRAINING PROGRAM

## 2019-10-04 PROCEDURE — A9270 NON-COVERED ITEM OR SERVICE: HCPCS | Performed by: INTERNAL MEDICINE

## 2019-10-04 PROCEDURE — 700102 HCHG RX REV CODE 250 W/ 637 OVERRIDE(OP): Performed by: HOSPITALIST

## 2019-10-04 RX ORDER — METHYLPREDNISOLONE SODIUM SUCCINATE 125 MG/2ML
125 INJECTION, POWDER, LYOPHILIZED, FOR SOLUTION INTRAMUSCULAR; INTRAVENOUS ONCE
Status: COMPLETED | OUTPATIENT
Start: 2019-10-04 | End: 2019-10-04

## 2019-10-04 RX ORDER — LIDOCAINE HYDROCHLORIDE 10 MG/ML
INJECTION, SOLUTION EPIDURAL; INFILTRATION; INTRACAUDAL; PERINEURAL
Status: DISPENSED
Start: 2019-10-04 | End: 2019-10-05

## 2019-10-04 RX ORDER — TRAMADOL HYDROCHLORIDE 50 MG/1
50 TABLET ORAL EVERY 6 HOURS PRN
Status: DISCONTINUED | OUTPATIENT
Start: 2019-10-04 | End: 2019-10-05 | Stop reason: HOSPADM

## 2019-10-04 RX ADMIN — METHYLPREDNISOLONE SODIUM SUCCINATE 125 MG: 125 INJECTION, POWDER, FOR SOLUTION INTRAMUSCULAR; INTRAVENOUS at 17:47

## 2019-10-04 RX ADMIN — TRAMADOL HYDROCHLORIDE 50 MG: 50 TABLET ORAL at 11:49

## 2019-10-04 RX ADMIN — TRAMADOL HYDROCHLORIDE 50 MG: 50 TABLET ORAL at 23:19

## 2019-10-04 RX ADMIN — ACETAMINOPHEN 650 MG: 325 TABLET, FILM COATED ORAL at 21:47

## 2019-10-04 RX ADMIN — ACETAMINOPHEN 650 MG: 325 TABLET, FILM COATED ORAL at 11:48

## 2019-10-04 RX ADMIN — CEFTRIAXONE SODIUM 2 G: 2 INJECTION, POWDER, FOR SOLUTION INTRAMUSCULAR; INTRAVENOUS at 17:52

## 2019-10-04 ASSESSMENT — ENCOUNTER SYMPTOMS
NERVOUS/ANXIOUS: 0
CONSTIPATION: 0
NAUSEA: 1
VOMITING: 0
SORE THROAT: 0
ABDOMINAL PAIN: 1
BLOOD IN STOOL: 0
FEVER: 0
PALPITATIONS: 0
DEPRESSION: 0
DIARRHEA: 1

## 2019-10-04 NOTE — PROGRESS NOTES
[facility] Medicine Daily Progress Note    Date of Service: 10/4/2019 Code Status: Full Code     Chief Complaint  Chief Complaint   Patient presents with   • Epigastric Pain     upper stomach woke pt up at apx 0300 radiates to back. intermittent.    • N/V       Consultants/Specialty: None Disposition: Remain IP     Hospital Course     ER and Admission Day course  24 y.o. female who presented 10/2/2019 with past medical history of eclampsia, group B strep comes into the emergency room with complaints of epigastric abdominal pain that is been present for the past year.  The pain she describes as a colicky sharp pain that is intermittent.  It radiates to her shoulder blades.  She is been worked up in the past with abdominal ultrasounds and PPI trials that were not effective.  It is associated with nausea vomiting and diarrhea.  The diarrhea is only been present for 1 day and she has had 5 watery stools without mucus.  Patient denies any travel history, sick exposures or history of autoimmune disease.  Upon arrival to emergency room her vitals were within normal limits.  Right upper quadrant ultrasound was negative for any acute findings  CT scan found evidence of enteritis.  EKG interpreted by me found normal sinus rhythm with no ST segment changes  10/3 - TSH suppressed, thyroid work-up, brief discussion with GI, wouldn't scope with a thyroid in that condition.  10/4 - Biopsy completed. Awaiting further lab results       Interval Problem Update  Patient was seen and evaluated today for epigastric pain      Review of Systems  Review of Systems   Constitutional: Positive for malaise/fatigue. Negative for fever.   HENT: Negative for sore throat.    Cardiovascular: Negative for chest pain and palpitations.   Gastrointestinal: Positive for abdominal pain, diarrhea and nausea. Negative for blood in stool, constipation and vomiting.   Skin: Negative for itching and rash.   Psychiatric/Behavioral: Negative for depression. The  patient is not nervous/anxious.     Physical Exam  Physical Exam   Constitutional: She is oriented to person, place, and time. No distress.   Neck:   Difficult thyroid exam with body habitus, no lymphadenopathy   Cardiovascular: Regular rhythm.   No murmur heard.  Abdominal: She exhibits no distension. There is no tenderness.   Musculoskeletal: She exhibits no edema or deformity.   Neurological: She is alert and oriented to person, place, and time.   Skin: Skin is warm and dry. She is not diaphoretic.   Psychiatric: She has a normal mood and affect. Her behavior is normal. Judgment and thought content normal.   Nursing note and vitals reviewed.       I/Os    Intake/Output Summary (Last 24 hours) at 10/4/2019 0832  Last data filed at 10/3/2019 0930  Gross per 24 hour   Intake 240 ml   Output --   Net 240 ml    Vital Signs  Temp:  [36.6 °C (97.8 °F)-36.7 °C (98 °F)] 36.6 °C (97.8 °F)  Pulse:  [63-87] 71  Resp:  [16-18] 16  BP: (108-120)/(58-84) 108/58  SpO2:  [98 %-99 %] 99 %     Laboratory  Results from last 7 days   Lab Units 10/03/19  0233 10/02/19  1155   WBC 1501 K/uL 10.2 17.7*   HGB 1503 g/dL 12.8 15.2   HCT 1504 % 39.0 46.3   PLATELET COUNT 1518 K/uL 258 342    Results from last 7 days   Lab Units 10/03/19  0233 10/02/19  1759 10/02/19  1155   SODIUM 101 mmol/L 137  --  141   POTASSIUM 102 mmol/L 3.6  --  3.6   CHLORIDE 103 mmol/L 109  --  111   CO2 104 mmol/L 23  --  23   ANION GAP ANION  5.0  --  7.0    mg/dL 9  --  13   CREATININE 109 mg/dL 0.67  --  0.69   CALCIUM 105 mg/dL 8.6  --  9.1   GLUCOSE 112 mg/dL 96  --  106*   IF DANIELA AMER 082019 mL/min/1.73 m 2 >60  --  >60   IF NON AFRICAN AMER 109GFRB mL/min/1.73 m 2 >60  --  >60   MAGNESIUM 561 mg/dL  --  1.5  --            Medications    Scheduled medications:  enoxaparin (LOVENOX) injection 40 mg Subcutaneous Once   nicotine 14 mg Transdermal Daily-0600   Pharmacy 1 Each Other PHARMACY TO DOSE        Medications were reviewed today.       Assessment/Plan  * Enteritis- (present on admission)  Assessment & Plan  10/4 - abdominal pain not particularly changed, continued clear liquids, patient does smoke marijuana daily. Awaiting further results.   10/3 - discussion with GI for calprotectin for inflammatory response, mildly elevated ESR.  Will hold for formal consultation pending results of thyroid work-up.  10/2 - adm - Epigastric abdominal pain and evidence of inflammation of small bowel on CT scan. Stool studies ordered  C. difficile is been ordered. ESR slightly elevated but CRP is normal. Pain control with oral and IV pain medications will have to monitor her mental respiratory status closely  Patient was started on IV Flagyl de-escalate as appropriate    Hyperthyroidism- (present on admission)  Assessment & Plan  10/4 - Biopsy completed, histology pending.  10/3 - TSH on admission suppressed, no hx of thyroid disease, ordered thyoid work-up and ultrasound.  Suspicion for this as cause of the symptoms.  Possibly Hashimoto vs thyroid cancer.    Tobacco dependence- (present on admission)  Assessment & Plan  10/3-10/4 - stable  10/2 - adm - Tobacco cessation education provided for more than 3 minutes, discussed options of nicotine patch, medical treatment with wellbutrin and chantix. Discussed the risks of smoking including increased risk of heart disease, stroke, cancer and COPD. Discussed the benefits of quitting smoking. Nicotine replacement protocol will be provided to the patient.      Obese- (present on admission)  Assessment & Plan  10/3-10/4 - stable  10/2 - adm - Patient has been counseled on diet and lifestyle modifications  Recommended outpatient weight management program and bariatric surgery evaluation  Pt educated on the increase of morbidity and mortality associated with excess weight including DM, Heart Disease, HTN, stroke, and sleep apnea.  Pt advised weight loss of 5% through reduced calorie, low carb diet and 150 mins of exercise  a week         VTE prophylaxis: scd    Imaging  US-SOFT TISSUES OF HEAD - NECK   Final Result         1.  Right thyroid mass, highly suspicious.      ACR TI-RADS Recommendations:  TR5 - FNA recommended      Recommendations based on the American College of Radiology Thyroid imaging, reporting and Data System (TI-RADS) 2017.      These findings were discussed with the patient's clinician, Wilbur Chapin, on 10/4/2019 7:37 AM.      CT-ABDOMEN-PELVIS WITH   Final Result      1.  Thickened loops of small intestine within the abdomen inferiorly. This primarily involves the distal ileum. Differential diagnosis includes enteritis as well as inflammatory bowel disease.      2.  3.3 cm left ovarian cyst with a small amount of free fluid dependently within the pelvis.      US-RUQ   Final Result      1.  Contracted gallbladder. No evidence of gallstone or biliary ductal dilatation.      2.  Possible medullary sponge kidney.

## 2019-10-04 NOTE — PROGRESS NOTES
Site Marked and Procedure Confirmed with MD, patient and RN pre procedure. Patient was brought to IR1 and reclined into the supine position with site prepped.    Fine Needle Aspiration of Right Thyroid Nodule by MD Rojas assisted by RT Joyce, Right Lateral Neck access site.  ?  2 passes in CytoLyt collected by Dr. Rojas, specimen sent to Patholog, delivered to lab by KHADIJAH Petersen.  ?  Right lateral neck access site CDI and soft with a band aid over the site.    Patient tolerated procedure, hemodynamically stable; pt awake and alert post procedure; report given to KHADIJAH Coffey; patient transported to S5-2 via IR RN monitored then transferred care to report RN.

## 2019-10-04 NOTE — PROGRESS NOTES
Assumed care of patient at 1900. Received bedside report from day RN. Patient resting comfortably in bed.  and children at bedside. No signs of distress. Bed is in low and locked position. Non-slip socks are in place. Call light is within reach.

## 2019-10-04 NOTE — CARE PLAN
Problem: Communication  Goal: The ability to communicate needs accurately and effectively will improve  Outcome: PROGRESSING AS EXPECTED     Problem: Infection  Goal: Will remain free from infection  Outcome: PROGRESSING AS EXPECTED     Problem: Bowel/Gastric:  Goal: Normal bowel function is maintained or improved  Outcome: PROGRESSING AS EXPECTED

## 2019-10-04 NOTE — ASSESSMENT & PLAN NOTE
10/5 - Anti-TPO negative, thyroid labs still pending. Patient will need follow-up with endocrine and pcp  10/4 - Biopsy completed, histology pending.  10/3 - TSH on admission suppressed, no hx of thyroid disease, ordered thyoid work-up and ultrasound.  Suspicion for this as cause of the symptoms.  Possibly Hashimoto vs thyroid cancer.    U/S findings - Nodule #1. Location:  Right  mid. Size:  3.1 x 1.9 x 1.6 cm. Composition:  Mixed-1. Echogenicity:  Hypoechoic-2. Shape:  Taller than wide-3. Margins:  Irregular-2. Echogenic Foci:  Microscopic-3. ACR TIRADS points/category:  11 - TR5 - Highly Suspicious

## 2019-10-05 VITALS
BODY MASS INDEX: 32.33 KG/M2 | SYSTOLIC BLOOD PRESSURE: 125 MMHG | HEART RATE: 68 BPM | RESPIRATION RATE: 16 BRPM | WEIGHT: 218.26 LBS | TEMPERATURE: 98.2 F | HEIGHT: 69 IN | DIASTOLIC BLOOD PRESSURE: 79 MMHG | OXYGEN SATURATION: 97 %

## 2019-10-05 LAB
E COLI SXT1+2 STL IA: NORMAL
IGA SERPL-MCNC: 424 MG/DL (ref 68–408)
SIGNIFICANT IND 70042: NORMAL
SITE SITE: NORMAL
SOURCE SOURCE: NORMAL
T3RU NFR SERPL: 36 % (ref 28–41)
T4BG SERPL-MCNC: 13 UG/ML (ref 13–30)
THYROGLOB AB SERPL-ACNC: <0.9 IU/ML (ref 0–4)
THYROGLOB SERPL-MCNC: 24.6 NG/ML (ref 1.3–31.8)
THYROGLOB SERPL-MCNC: NORMAL NG/ML (ref 1.3–31.8)
TTG IGA SER IA-ACNC: 1 U/ML (ref 0–3)

## 2019-10-05 PROCEDURE — A9270 NON-COVERED ITEM OR SERVICE: HCPCS | Performed by: INTERNAL MEDICINE

## 2019-10-05 PROCEDURE — A9270 NON-COVERED ITEM OR SERVICE: HCPCS | Performed by: HOSPITALIST

## 2019-10-05 PROCEDURE — G0378 HOSPITAL OBSERVATION PER HR: HCPCS

## 2019-10-05 PROCEDURE — 99217 PR OBSERVATION CARE DISCHARGE: CPT | Performed by: STUDENT IN AN ORGANIZED HEALTH CARE EDUCATION/TRAINING PROGRAM

## 2019-10-05 PROCEDURE — 700102 HCHG RX REV CODE 250 W/ 637 OVERRIDE(OP): Performed by: INTERNAL MEDICINE

## 2019-10-05 PROCEDURE — 700102 HCHG RX REV CODE 250 W/ 637 OVERRIDE(OP): Performed by: HOSPITALIST

## 2019-10-05 RX ADMIN — TRAMADOL HYDROCHLORIDE 50 MG: 50 TABLET ORAL at 10:43

## 2019-10-05 RX ADMIN — ACETAMINOPHEN 650 MG: 325 TABLET, FILM COATED ORAL at 10:43

## 2019-10-05 ASSESSMENT — ENCOUNTER SYMPTOMS
ABDOMINAL PAIN: 1
NAUSEA: 1
COUGH: 0
DEPRESSION: 0
CONSTIPATION: 0
DIARRHEA: 0
VOMITING: 0
FEVER: 0
PALPITATIONS: 0
NERVOUS/ANXIOUS: 0
SHORTNESS OF BREATH: 0

## 2019-10-05 NOTE — DISCHARGE SUMMARY
Discharge Summary    CHIEF COMPLAINT ON ADMISSION  Chief Complaint   Patient presents with   • Epigastric Pain     upper stomach woke pt up at apx 0300 radiates to back. intermittent.    • N/V       Reason for Admission  Stomach pain     Admission Date  10/2/2019    CODE STATUS  Full Code    HPI & HOSPITAL COURSE    ER and Admission Day course  24 y.o. female who presented 10/2/2019 with past medical history of eclampsia, group B strep comes into the emergency room with complaints of epigastric abdominal pain that is been present for the past year.  The pain she describes as a colicky sharp pain that is intermittent.  It radiates to her shoulder blades.  She is been worked up in the past with abdominal ultrasounds and PPI trials that were not effective.  It is associated with nausea vomiting and diarrhea.  The diarrhea is only been present for 1 day and she has had 5 watery stools without mucus.  Patient denies any travel history, sick exposures or history of autoimmune disease.  Upon arrival to emergency room her vitals were within normal limits.  Right upper quadrant ultrasound was negative for any acute findings  CT scan found evidence of enteritis.  EKG interpreted by me found normal sinus rhythm with no ST segment changes  10/3 - TSH suppressed, thyroid work-up, brief discussion with GI, wouldn't scope with a thyroid in that condition.  10/4 - Biopsy completed. Awaiting further lab results  10/5 - Patient will need to follow-up with a pcp for thyroid results and further care. Patient very likely has a wheat allergy/celiac/gluten insensitivity.  Immunoglobulin A elevated.    Discharge Day Exam    Review of Systems  Review of Systems   Constitutional: Negative for fever and malaise/fatigue.   Respiratory: Negative for cough and shortness of breath.    Cardiovascular: Negative for chest pain and palpitations.   Gastrointestinal: Positive for abdominal pain and nausea. Negative for constipation, diarrhea and  vomiting.   Skin: Negative for itching and rash.   Psychiatric/Behavioral: Negative for depression. The patient is not nervous/anxious.     Physical Exam  Physical Exam   Constitutional: She is oriented to person, place, and time. No distress.   Pulmonary/Chest: No respiratory distress. She has no wheezes.   Abdominal: She exhibits no distension. There is no tenderness.   Neurological: She is alert and oriented to person, place, and time.   Skin: Skin is warm and dry. She is not diaphoretic.   Psychiatric: She has a normal mood and affect. Her behavior is normal. Judgment and thought content normal.   Nursing note and vitals reviewed.       Vital Signs  Temp:  [36.6 °C (97.8 °F)-37 °C (98.6 °F)] 36.8 °C (98.2 °F)  Pulse:  [63-81] 68  Resp:  [14-18] 16  BP: (112-148)/(37-93) 125/79  SpO2:  [96 %-99 %] 97 %    Discharge Day Assessment/Plan  * Enteritis- (present on admission)  Assessment & Plan  10/5 - Celiac panel came back + of Immunoglobulin A of 424.  Patient consumes substantial amount of wheat products, struggles with seasonal allergies.  Very long discussion of possible celiac disease vs allergic to grains and refraining completely from any grains in her diet.  Patient feels well enough to go home.  10/4 - abdominal pain not particularly changed, continued clear liquids, patient does smoke marijuana daily. Awaiting further results.   10/3 - discussion with GI for calprotectin for inflammatory response, mildly elevated ESR.  Will hold for formal consultation pending results of thyroid work-up.  10/2 - adm - Epigastric abdominal pain and evidence of inflammation of small bowel on CT scan. Stool studies ordered  C. difficile is been ordered. ESR slightly elevated but CRP is normal. Pain control with oral and IV pain medications will have to monitor her mental respiratory status closely  Patient was started on IV Flagyl de-escalate as appropriate    Hyperthyroidism- (present on admission)  Assessment & Plan  10/5 -  Anti-TPO negative, thyroid labs still pending. Patient will need follow-up with endocrine and pcp  10/4 - Biopsy completed, histology pending.  10/3 - TSH on admission suppressed, no hx of thyroid disease, ordered thyoid work-up and ultrasound.  Suspicion for this as cause of the symptoms.  Possibly Hashimoto vs thyroid cancer.    U/S findings - Nodule #1. Location:  Right  mid. Size:  3.1 x 1.9 x 1.6 cm. Composition:  Mixed-1. Echogenicity:  Hypoechoic-2. Shape:  Taller than wide-3. Margins:  Irregular-2. Echogenic Foci:  Microscopic-3. ACR TIRADS points/category:  11 - TR5 - Highly Suspicious      Tobacco dependence- (present on admission)  Assessment & Plan  10/3-10/5 - stable  10/2 - adm - Tobacco cessation education provided for more than 3 minutes, discussed options of nicotine patch, medical treatment with wellbutrin and chantix. Discussed the risks of smoking including increased risk of heart disease, stroke, cancer and COPD. Discussed the benefits of quitting smoking. Nicotine replacement protocol will be provided to the patient.      Obese- (present on admission)  Assessment & Plan  10/3-10/5 - stable  10/2 - adm - Patient has been counseled on diet and lifestyle modifications  Recommended outpatient weight management program and bariatric surgery evaluation  Pt educated on the increase of morbidity and mortality associated with excess weight including DM, Heart Disease, HTN, stroke, and sleep apnea.  Pt advised weight loss of 5% through reduced calorie, low carb diet and 150 mins of exercise a week           Therefore, she is discharged in good and stable condition to home with close outpatient follow-up.    The patient met 2-midnight criteria for an inpatient stay at the time of discharge.    Discharge Date  10/05/19      FOLLOW UP ITEMS POST DISCHARGE  Thyroid Cancer treatment necessity    DISCHARGE DIAGNOSES  Principal Problem:    Enteritis POA: Yes  Active Problems:    Obese POA: Yes    Tobacco  dependence POA: Yes    Hyperthyroidism POA: Yes  Resolved Problems:    * No resolved hospital problems. *      FOLLOW UP  No future appointments.  [facility]  [address]  [address]  [place] [id number] [phone]    Please call to establish with a Primary Care Physician. Thank you       MEDICATIONS ON DISCHARGE     Medication List      You have not been prescribed any medications.         Allergies  Allergies   Allergen Reactions   • Clindamycin Hives   • Sulfa Drugs        DIET  Orders Placed This Encounter   Procedures   • Diet Order Regular     Standing Status:   Standing     Number of Occurrences:   1     Order Specific Question:   Diet:     Answer:   Regular [1]     Order Specific Question:   Macronutrient modifications:     Answer:   High Fat [6]     Order Specific Question:   Macronutrient modifications:     Answer:   High Protein [4]     Order Specific Question:   Miscellaneous modifications:     Answer:   Gluten Free per MD [9]       ACTIVITY  As tolerated.  Weight bearing as tolerated    CONSULTATIONS  None    PROCEDURES  Thyroid FNA  Thyroid Ultrasound    LABORATORY  Lab Results   Component Value Date    SODIUM 137 10/03/2019    POTASSIUM 3.6 10/03/2019    CHLORIDE 109 10/03/2019    CO2 23 10/03/2019    GLUCOSE 96 10/03/2019    BUN 9 10/03/2019    CREATININE 0.67 10/03/2019        Lab Results   Component Value Date    WBC 10.2 10/03/2019    HEMOGLOBIN 12.8 10/03/2019    HEMATOCRIT 39.0 10/03/2019    PLATELETCT 258 10/03/2019        Total time of the discharge process exceeds 45 minutes.

## 2019-10-05 NOTE — DISCHARGE INSTRUCTIONS
Discharge Instructions    Discharged to home by car with self. Discharged via walking, hospital escort: Yes.  Special equipment needed: Not Applicable    Be sure to schedule a follow-up appointment with your primary care doctor or any specialists as instructed.     Discharge Plan:   Diet Plan: Discussed  Activity Level: Discussed  Smoking Cessation Offered: Patient Refused  Confirmed Follow up Appointment: Appointment Scheduled  Confirmed Symptoms Management: Discussed  Medication Reconciliation Updated: Yes  Influenza Vaccine Indication: Patient Refuses    I understand that a diet low in cholesterol, fat, and sodium is recommended for good health. Unless I have been given specific instructions below for another diet, I accept this instruction as my diet prescription.   Other diet: avoid wheat     Special Instructions: None    · Is patient discharged on Warfarin / Coumadin?   No     Depression / Suicide Risk    As you are discharged from this [facility] facility, it is important to learn how to keep safe from harming yourself.    Recognize the warning signs:  · Abrupt changes in personality, positive or negative- including increase in energy   · Giving away possessions  · Change in eating patterns- significant weight changes-  positive or negative  · Change in sleeping patterns- unable to sleep or sleeping all the time   · Unwillingness or inability to communicate  · Depression  · Unusual sadness, discouragement and loneliness  · Talk of wanting to die  · Neglect of personal appearance   · Rebelliousness- reckless behavior  · Withdrawal from people/activities they love  · Confusion- inability to concentrate     If you or a loved one observes any of these behaviors or has concerns about self-harm, here's what you can do:  · Talk about it- your feelings and reasons for harming yourself  · Remove any means that you might use to hurt yourself (examples: pills, rope, extension cords, firearm)  · Get professional help  "from the community (Mental Health, Substance Abuse, psychological counseling)  · Do not be alone:Call your Safe Contact- someone whom you trust who will be there for you.  · Call your local CRISIS HOTLINE 857-2360 or 546-423-2701  · Call your local Children's Mobile Crisis Response Team [place] (696) 869-0325 or www.[employer]  · Call the toll free National Suicide Prevention Hotlines   · National Suicide Prevention Lifeline 133-512-JYKI (8258)  · [employer] [facility] Line Network 800-SUICIDE (338-5335)    Discharge Instructions per Wilbur Chapin D.O.    Use google to search the following physicians for information about how to adjust your diet.    Dr. Teto Cooper  Additional help can be found by googling \"In Defense of Food\" and watching the material.    You may have an allergy to wheat/grasses.  This means that ingestion of all grain products can lead to your abdominal symptoms.    You need to be seen by a physician to coordinate the care of your thyroid. I suspect you do have thyroid cancer that will require further medial treatment.  This is not life threatening and can be entirely taken care of in the outpatient setting.  You could google \"thyroid nodules\", and \"thyroid cancer\" to develop a better understanding of what to expect.  - Daily Fluid Intake -      Don't \"drink lots of water\".  Don't \"stay hydrated\".  Don't \"drink half you weight in ounces every day.\" Especially since nobody seems to know who \"everyone\" is that keeps telling them to do these things.  These things should be regarded as completely erroneous.  The body has a very effective thirst mechanism that tells us when we should drink something.  Being \"thirsty all the time\" should be considered to have something wrong the same as \"hungry all the time\" or \"tired all the time\" or \"always depressed\" or \"sweating all the time\".  If you are waking numerous times each night to urinate, you're very likely consuming too much " fluid.  It's also destroying your body's healing component from sleep.    There is such thing as drinking too much water!!!  You can exceed your kidneys' capability to process all of your water and you will put on weight if you do.  32-44oz of total drinking fluid intake per day is healthy.  You'll maybe need more if you're sweating from physical activity, but if you're sweating and breathing hard during exercise it's because you're burning fat.  Don't replace it with excessive water.  Otherwise, only drink when you're thirsty and not too much.  Don't make a thing out of your water bottle. You do not need to carry around a water bottle on a daily basis.  It's totally unnecessary.  Remember drinking fountains and getting yelled at for taking too long and the line behind you? Remember when stores didn't have water bottle sections and gas stations didn't have water sections?    DO NOT REPLACE SODA INTAKE WITH WATER!!!!! It'll just turn into water weight.  Just stop drinking the soda and see if you stop being thirsty all the time when you drink it.       - Internationally recognized guidelines on daily water intake -      Normal adults are considered to have a basic water intake or generation of approximately 1600 mL per day (~48 oz.), composed of the following:      ?Ingested water - 500 mL - 16.7 oz... This is smaller than the water bottles bought by the case that are 16.9oz.   ?Water in food - 800 mL - Potatoes are half water.  Grains are half water, water adeel...  It counts and it adds up.   ?Water from oxidation - 300 mL - chemical reactions in the body from the food you eat.       DIET: Gluten Free    ACTIVITY: as tolerated    DIAGNOSIS: Enteritis    Return to ER if needed

## 2019-10-06 LAB
BACTERIA STL CULT: NORMAL
CALPROTECTIN STL-MCNT: 378 UG/G
SIGNIFICANT IND 70042: NORMAL
SITE SITE: NORMAL
SOURCE SOURCE: NORMAL

## 2019-10-07 LAB
BACTERIA STL CULT: NORMAL
SIGNIFICANT IND 70042: NORMAL
SITE SITE: NORMAL
SOURCE SOURCE: NORMAL

## 2019-10-10 ENCOUNTER — TELEPHONE (OUTPATIENT)
Dept: HOSPITALIST | Facility: MEDICAL CENTER | Age: 24
End: 2019-10-10

## 2019-10-11 NOTE — TELEPHONE ENCOUNTER
Contacted patient to discuss her biopsy results. Patient states she made her follow-up appointment and was shared the results as benign. States she was also started on a beta-blocker.  No concerns expressed by patient.    Wilbur Chapin D.O., M.P.H.  Family Medicine - Hospitalist Physician  Note finalized on  10/10/2019 6:15 PM  [facility]

## 2020-01-03 ENCOUNTER — HOSPITAL ENCOUNTER (OUTPATIENT)
Facility: MEDICAL CENTER | Age: 25
End: 2020-01-04
Attending: EMERGENCY MEDICINE | Admitting: HOSPITALIST
Payer: MEDICAID

## 2020-01-03 DIAGNOSIS — R79.89 LOW TSH LEVEL: ICD-10-CM

## 2020-01-03 DIAGNOSIS — N39.0 URINARY TRACT INFECTION WITHOUT HEMATURIA, SITE UNSPECIFIED: ICD-10-CM

## 2020-01-03 DIAGNOSIS — R11.2 INTRACTABLE VOMITING WITH NAUSEA, UNSPECIFIED VOMITING TYPE: ICD-10-CM

## 2020-01-03 PROBLEM — D72.829 LEUKOCYTOSIS: Status: ACTIVE | Noted: 2020-01-03

## 2020-01-03 PROBLEM — E11.9 DIABETES MELLITUS TYPE II, CONTROLLED (HCC): Status: ACTIVE | Noted: 2020-01-03

## 2020-01-03 LAB
ALBUMIN SERPL BCP-MCNC: 4.4 G/DL (ref 3.2–4.9)
ALBUMIN/GLOB SERPL: 1.3 G/DL
ALP SERPL-CCNC: 59 U/L (ref 30–99)
ALT SERPL-CCNC: 20 U/L (ref 2–50)
ANION GAP SERPL CALC-SCNC: 8 MMOL/L (ref 0–11.9)
APPEARANCE UR: ABNORMAL
AST SERPL-CCNC: 18 U/L (ref 12–45)
BACTERIA #/AREA URNS HPF: ABNORMAL /HPF
BASOPHILS # BLD AUTO: 0.4 % (ref 0–1.8)
BASOPHILS # BLD: 0.04 K/UL (ref 0–0.12)
BILIRUB SERPL-MCNC: 0.6 MG/DL (ref 0.1–1.5)
BILIRUB UR QL STRIP.AUTO: NEGATIVE
BUN SERPL-MCNC: 9 MG/DL (ref 8–22)
CALCIUM SERPL-MCNC: 9.5 MG/DL (ref 8.5–10.5)
CHLORIDE SERPL-SCNC: 105 MMOL/L (ref 96–112)
CO2 SERPL-SCNC: 26 MMOL/L (ref 20–33)
COLOR UR: ABNORMAL
CREAT SERPL-MCNC: 0.77 MG/DL (ref 0.5–1.4)
EOSINOPHIL # BLD AUTO: 0.31 K/UL (ref 0–0.51)
EOSINOPHIL NFR BLD: 2.8 % (ref 0–6.9)
EPI CELLS #/AREA URNS HPF: ABNORMAL /HPF
ERYTHROCYTE [DISTWIDTH] IN BLOOD BY AUTOMATED COUNT: 39.8 FL (ref 35.9–50)
GLOBULIN SER CALC-MCNC: 3.5 G/DL (ref 1.9–3.5)
GLUCOSE BLD-MCNC: 98 MG/DL (ref 65–99)
GLUCOSE SERPL-MCNC: 104 MG/DL (ref 65–99)
GLUCOSE UR STRIP.AUTO-MCNC: NEGATIVE MG/DL
HCG SERPL QL: NEGATIVE
HCT VFR BLD AUTO: 47.3 % (ref 37–47)
HGB BLD-MCNC: 16.2 G/DL (ref 12–16)
HYALINE CASTS #/AREA URNS LPF: ABNORMAL /LPF
IMM GRANULOCYTES # BLD AUTO: 0.03 K/UL (ref 0–0.11)
IMM GRANULOCYTES NFR BLD AUTO: 0.3 % (ref 0–0.9)
KETONES UR STRIP.AUTO-MCNC: ABNORMAL MG/DL
LEUKOCYTE ESTERASE UR QL STRIP.AUTO: ABNORMAL
LIPASE SERPL-CCNC: 18 U/L (ref 11–82)
LYMPHOCYTES # BLD AUTO: 1.17 K/UL (ref 1–4.8)
LYMPHOCYTES NFR BLD: 10.6 % (ref 22–41)
MAGNESIUM SERPL-MCNC: 1.9 MG/DL (ref 1.5–2.5)
MCH RBC QN AUTO: 30 PG (ref 27–33)
MCHC RBC AUTO-ENTMCNC: 34.2 G/DL (ref 33.6–35)
MCV RBC AUTO: 87.6 FL (ref 81.4–97.8)
MICRO URNS: ABNORMAL
MONOCYTES # BLD AUTO: 0.51 K/UL (ref 0–0.85)
MONOCYTES NFR BLD AUTO: 4.6 % (ref 0–13.4)
MUCOUS THREADS #/AREA URNS HPF: ABNORMAL /HPF
NEUTROPHILS # BLD AUTO: 9 K/UL (ref 2–7.15)
NEUTROPHILS NFR BLD: 81.3 % (ref 44–72)
NITRITE UR QL STRIP.AUTO: POSITIVE
NRBC # BLD AUTO: 0 K/UL
NRBC BLD-RTO: 0 /100 WBC
PH UR STRIP.AUTO: 8.5 [PH] (ref 5–8)
PLATELET # BLD AUTO: 308 K/UL (ref 164–446)
PMV BLD AUTO: 9 FL (ref 9–12.9)
POTASSIUM SERPL-SCNC: 3.8 MMOL/L (ref 3.6–5.5)
PROT SERPL-MCNC: 7.9 G/DL (ref 6–8.2)
PROT UR QL STRIP: 30 MG/DL
RBC # BLD AUTO: 5.4 M/UL (ref 4.2–5.4)
RBC # URNS HPF: ABNORMAL /HPF
RBC UR QL AUTO: ABNORMAL
SODIUM SERPL-SCNC: 139 MMOL/L (ref 135–145)
SP GR UR STRIP.AUTO: 1.02
T4 FREE SERPL-MCNC: 0.89 NG/DL (ref 0.53–1.43)
TSH SERPL DL<=0.005 MIU/L-ACNC: <0.005 UIU/ML (ref 0.38–5.33)
UROBILINOGEN UR STRIP.AUTO-MCNC: 1 MG/DL
WBC # BLD AUTO: 11.1 K/UL (ref 4.8–10.8)
WBC #/AREA URNS HPF: ABNORMAL /HPF

## 2020-01-03 PROCEDURE — 80053 COMPREHEN METABOLIC PANEL: CPT

## 2020-01-03 PROCEDURE — 99220 PR INITIAL OBSERVATION CARE,LEVL III: CPT | Performed by: HOSPITALIST

## 2020-01-03 PROCEDURE — G0378 HOSPITAL OBSERVATION PER HR: HCPCS

## 2020-01-03 PROCEDURE — 700111 HCHG RX REV CODE 636 W/ 250 OVERRIDE (IP): Performed by: EMERGENCY MEDICINE

## 2020-01-03 PROCEDURE — 96365 THER/PROPH/DIAG IV INF INIT: CPT

## 2020-01-03 PROCEDURE — 99285 EMERGENCY DEPT VISIT HI MDM: CPT

## 2020-01-03 PROCEDURE — 83735 ASSAY OF MAGNESIUM: CPT

## 2020-01-03 PROCEDURE — 36415 COLL VENOUS BLD VENIPUNCTURE: CPT

## 2020-01-03 PROCEDURE — 84443 ASSAY THYROID STIM HORMONE: CPT

## 2020-01-03 PROCEDURE — 96375 TX/PRO/DX INJ NEW DRUG ADDON: CPT

## 2020-01-03 PROCEDURE — 700101 HCHG RX REV CODE 250: Performed by: HOSPITALIST

## 2020-01-03 PROCEDURE — 84703 CHORIONIC GONADOTROPIN ASSAY: CPT

## 2020-01-03 PROCEDURE — 87186 SC STD MICRODIL/AGAR DIL: CPT

## 2020-01-03 PROCEDURE — 700105 HCHG RX REV CODE 258: Performed by: EMERGENCY MEDICINE

## 2020-01-03 PROCEDURE — 81001 URINALYSIS AUTO W/SCOPE: CPT

## 2020-01-03 PROCEDURE — 87077 CULTURE AEROBIC IDENTIFY: CPT

## 2020-01-03 PROCEDURE — 700102 HCHG RX REV CODE 250 W/ 637 OVERRIDE(OP): Performed by: HOSPITALIST

## 2020-01-03 PROCEDURE — 87086 URINE CULTURE/COLONY COUNT: CPT

## 2020-01-03 PROCEDURE — 85025 COMPLETE CBC W/AUTO DIFF WBC: CPT

## 2020-01-03 PROCEDURE — 82962 GLUCOSE BLOOD TEST: CPT

## 2020-01-03 PROCEDURE — 84439 ASSAY OF FREE THYROXINE: CPT

## 2020-01-03 PROCEDURE — 83690 ASSAY OF LIPASE: CPT

## 2020-01-03 RX ORDER — PROCHLORPERAZINE EDISYLATE 5 MG/ML
10 INJECTION INTRAMUSCULAR; INTRAVENOUS ONCE
Status: COMPLETED | OUTPATIENT
Start: 2020-01-03 | End: 2020-01-03

## 2020-01-03 RX ORDER — POLYETHYLENE GLYCOL 3350 17 G/17G
1 POWDER, FOR SOLUTION ORAL
Status: DISCONTINUED | OUTPATIENT
Start: 2020-01-03 | End: 2020-01-04 | Stop reason: HOSPADM

## 2020-01-03 RX ORDER — OXYCODONE HYDROCHLORIDE 5 MG/1
2.5 TABLET ORAL
Status: DISCONTINUED | OUTPATIENT
Start: 2020-01-03 | End: 2020-01-04 | Stop reason: HOSPADM

## 2020-01-03 RX ORDER — LABETALOL HYDROCHLORIDE 5 MG/ML
10 INJECTION, SOLUTION INTRAVENOUS EVERY 4 HOURS PRN
Status: DISCONTINUED | OUTPATIENT
Start: 2020-01-03 | End: 2020-01-04 | Stop reason: HOSPADM

## 2020-01-03 RX ORDER — BISACODYL 10 MG
10 SUPPOSITORY, RECTAL RECTAL
Status: DISCONTINUED | OUTPATIENT
Start: 2020-01-03 | End: 2020-01-04 | Stop reason: HOSPADM

## 2020-01-03 RX ORDER — SODIUM CHLORIDE AND POTASSIUM CHLORIDE 150; 900 MG/100ML; MG/100ML
INJECTION, SOLUTION INTRAVENOUS CONTINUOUS
Status: DISCONTINUED | OUTPATIENT
Start: 2020-01-03 | End: 2020-01-04 | Stop reason: HOSPADM

## 2020-01-03 RX ORDER — HYDROMORPHONE HYDROCHLORIDE 1 MG/ML
0.25 INJECTION, SOLUTION INTRAMUSCULAR; INTRAVENOUS; SUBCUTANEOUS
Status: DISCONTINUED | OUTPATIENT
Start: 2020-01-03 | End: 2020-01-04 | Stop reason: HOSPADM

## 2020-01-03 RX ORDER — PROMETHAZINE HYDROCHLORIDE 25 MG/1
12.5-25 SUPPOSITORY RECTAL EVERY 4 HOURS PRN
Status: DISCONTINUED | OUTPATIENT
Start: 2020-01-03 | End: 2020-01-04 | Stop reason: HOSPADM

## 2020-01-03 RX ORDER — AMOXICILLIN 250 MG
2 CAPSULE ORAL 2 TIMES DAILY
Status: DISCONTINUED | OUTPATIENT
Start: 2020-01-03 | End: 2020-01-04 | Stop reason: HOSPADM

## 2020-01-03 RX ORDER — ONDANSETRON 2 MG/ML
4 INJECTION INTRAMUSCULAR; INTRAVENOUS EVERY 4 HOURS PRN
Status: DISCONTINUED | OUTPATIENT
Start: 2020-01-03 | End: 2020-01-04 | Stop reason: HOSPADM

## 2020-01-03 RX ORDER — PROCHLORPERAZINE EDISYLATE 5 MG/ML
5-10 INJECTION INTRAMUSCULAR; INTRAVENOUS EVERY 4 HOURS PRN
Status: DISCONTINUED | OUTPATIENT
Start: 2020-01-03 | End: 2020-01-04 | Stop reason: HOSPADM

## 2020-01-03 RX ORDER — ONDANSETRON 4 MG/1
4 TABLET, ORALLY DISINTEGRATING ORAL EVERY 4 HOURS PRN
Status: DISCONTINUED | OUTPATIENT
Start: 2020-01-03 | End: 2020-01-04 | Stop reason: HOSPADM

## 2020-01-03 RX ORDER — PROMETHAZINE HYDROCHLORIDE 25 MG/1
12.5-25 TABLET ORAL EVERY 4 HOURS PRN
Status: DISCONTINUED | OUTPATIENT
Start: 2020-01-03 | End: 2020-01-04 | Stop reason: HOSPADM

## 2020-01-03 RX ORDER — ACETAMINOPHEN 325 MG/1
650 TABLET ORAL EVERY 6 HOURS PRN
Status: DISCONTINUED | OUTPATIENT
Start: 2020-01-03 | End: 2020-01-04 | Stop reason: HOSPADM

## 2020-01-03 RX ORDER — ONDANSETRON 2 MG/ML
4 INJECTION INTRAMUSCULAR; INTRAVENOUS ONCE
Status: COMPLETED | OUTPATIENT
Start: 2020-01-03 | End: 2020-01-03

## 2020-01-03 RX ORDER — SODIUM CHLORIDE 9 MG/ML
1000 INJECTION, SOLUTION INTRAVENOUS ONCE
Status: COMPLETED | OUTPATIENT
Start: 2020-01-03 | End: 2020-01-03

## 2020-01-03 RX ORDER — OXYCODONE HYDROCHLORIDE 5 MG/1
5 TABLET ORAL
Status: DISCONTINUED | OUTPATIENT
Start: 2020-01-03 | End: 2020-01-04 | Stop reason: HOSPADM

## 2020-01-03 RX ADMIN — POTASSIUM CHLORIDE AND SODIUM CHLORIDE: 900; 150 INJECTION, SOLUTION INTRAVENOUS at 18:34

## 2020-01-03 RX ADMIN — CEFTRIAXONE SODIUM 2 G: 2 INJECTION, POWDER, FOR SOLUTION INTRAMUSCULAR; INTRAVENOUS at 08:08

## 2020-01-03 RX ADMIN — PROCHLORPERAZINE EDISYLATE 10 MG: 5 INJECTION INTRAMUSCULAR; INTRAVENOUS at 06:28

## 2020-01-03 RX ADMIN — POTASSIUM CHLORIDE AND SODIUM CHLORIDE: 900; 150 INJECTION, SOLUTION INTRAVENOUS at 09:59

## 2020-01-03 RX ADMIN — SODIUM CHLORIDE 1000 ML: 9 INJECTION, SOLUTION INTRAVENOUS at 05:27

## 2020-01-03 RX ADMIN — ONDANSETRON 4 MG: 2 INJECTION INTRAMUSCULAR; INTRAVENOUS at 05:27

## 2020-01-03 ASSESSMENT — LIFESTYLE VARIABLES
ON A TYPICAL DAY WHEN YOU DRINK ALCOHOL HOW MANY DRINKS DO YOU HAVE: 0
EVER FELT BAD OR GUILTY ABOUT YOUR DRINKING: NO
TOTAL SCORE: 0
EVER_SMOKED: NEVER
DO YOU DRINK ALCOHOL: NO
HOW MANY TIMES IN THE PAST YEAR HAVE YOU HAD 5 OR MORE DRINKS IN A DAY: 0
EVER HAD A DRINK FIRST THING IN THE MORNING TO STEADY YOUR NERVES TO GET RID OF A HANGOVER: NO
HAVE YOU EVER FELT YOU SHOULD CUT DOWN ON YOUR DRINKING: NO
HAVE PEOPLE ANNOYED YOU BY CRITICIZING YOUR DRINKING: NO
CONSUMPTION TOTAL: NEGATIVE
TOTAL SCORE: 0
TOTAL SCORE: 0
AVERAGE NUMBER OF DAYS PER WEEK YOU HAVE A DRINK CONTAINING ALCOHOL: 0

## 2020-01-03 ASSESSMENT — ENCOUNTER SYMPTOMS
CONSTITUTIONAL NEGATIVE: 1
BLOOD IN STOOL: 0
LOSS OF CONSCIOUSNESS: 0
HEMOPTYSIS: 0
CHILLS: 0
COUGH: 0
WHEEZING: 0
PSYCHIATRIC NEGATIVE: 1
NERVOUS/ANXIOUS: 0
DIAPHORESIS: 0
BRUISES/BLEEDS EASILY: 0
DIARRHEA: 0
DOUBLE VISION: 0
CARDIOVASCULAR NEGATIVE: 1
FEVER: 0
CONSTIPATION: 0
ABDOMINAL PAIN: 1
DIZZINESS: 0
FOCAL WEAKNESS: 0
SEIZURES: 0
PALPITATIONS: 0
MYALGIAS: 1
RESPIRATORY NEGATIVE: 1
HEADACHES: 1
HEARTBURN: 0
EYES NEGATIVE: 1
VOMITING: 1
NAUSEA: 1

## 2020-01-03 ASSESSMENT — COPD QUESTIONNAIRES
DURING THE PAST 4 WEEKS HOW MUCH DID YOU FEEL SHORT OF BREATH: NONE/LITTLE OF THE TIME
HAVE YOU SMOKED AT LEAST 100 CIGARETTES IN YOUR ENTIRE LIFE: NO/DON'T KNOW
DO YOU EVER COUGH UP ANY MUCUS OR PHLEGM?: NO/ONLY WITH OCCASIONAL COLDS OR INFECTIONS

## 2020-01-03 ASSESSMENT — PATIENT HEALTH QUESTIONNAIRE - PHQ9
2. FEELING DOWN, DEPRESSED, IRRITABLE, OR HOPELESS: NOT AT ALL
1. LITTLE INTEREST OR PLEASURE IN DOING THINGS: NOT AT ALL
SUM OF ALL RESPONSES TO PHQ9 QUESTIONS 1 AND 2: 0

## 2020-01-03 NOTE — ED PROVIDER NOTES
ED Provider Note    Scribed for Dami Herrera M.D. by Jensen Cruz. 1/3/2020  4:55 AM    Primary care provider: Pcp Pt States None  Means of arrival: Walk in  History obtained from: Patient  History limited by: None    CHIEF COMPLAINT  Chief Complaint   Patient presents with   • Nausea/Vomiting/Diarrhea     History of hyperthyrodism, for three days, nothing makes symtoms better or worse       HPI  Brianna Ruby is a 24 y.o. female who presents to the Emergency Department for nausea onset 3 days ago. She was prompted to come to the ED tonight when she awoke at 2:30 A.M. and developed vomiting and diarrhea. The patient also endorses associated epigastric abdominal pain, but no fever. No alleviating or aggravating factors reported. She states she was seen for similar symptoms in October 2019 and diagnosed with hyperthyroidism. The patient was admitted at that time and had a biopsy of her thyroid that revealed no abnormalities. She was subsequently discharged home and referred to Endocrinology for follow up. The patient states her follow up appointment is scheduled for 1/30. She does not take any daily medications. The patient reports no prior abdominal surgeries.     REVIEW OF SYSTEMS  Pertinent positives include nausea, vomiting, diarrhea, and abdominal pain.   Pertinent negatives include no fever.    All other systems reviewed and negative. See HPI for further details.     PAST MEDICAL HISTORY   has a past medical history of ASTHMA (1996), Enteritis (10/3/2019), GBS (group B Streptococcus carrier), +RV culture, currently pregnant (2/10/2014), Headache(784.0), Hyperthyroidism (10/4/2019), Migraine, Nausea and vomiting (1/3/2020), Strep throat, and Urinary tract infection, site not specified (8/4/13).    SURGICAL HISTORY  patient denies any surgical history    SOCIAL HISTORY  Social History     Tobacco Use   • Smoking status: Current Every Day Smoker     Packs/day: 0.25     Years: 6.00     Pack years: 1.50      "Types: Cigarettes     Last attempt to quit: 8/1/2016     Years since quitting: 3.4   • Smokeless tobacco: Never Used   Substance Use Topics   • Alcohol use: No   • Drug use: Yes     Comment: THC      Social History     Substance and Sexual Activity   Drug Use Yes    Comment: THC       FAMILY HISTORY  Family History   Problem Relation Age of Onset   • Asthma Mother    • Heart Failure Father    • Diabetes Maternal Grandmother        CURRENT MEDICATIONS  Home Medications     Reviewed by Leighton Lo M.D. (Physician) on 01/03/20 at 0813  Med List Status: Complete   Medication Last Dose Status        Patient Darrell Taking any Medications                       ALLERGIES  Allergies   Allergen Reactions   • Clindamycin Hives   • Sulfa Drugs Hives       PHYSICAL EXAM  VITAL SIGNS: /109   Pulse 100   Temp 36.1 °C (97 °F)   Resp 14   Ht 1.676 m (5' 6\")   Wt 91.9 kg (202 lb 9.6 oz)   SpO2 97%   BMI 32.70 kg/m²     Nursing note and vitals reviewed.  Constitutional: Well-developed and well-nourished. No distress.   HENT: Head is normocephalic and atraumatic. Dry mucous membranes. Oropharynx is clear without exudate or erythema.   Eyes: Pupils are equal, round, and reactive to light. Conjunctiva are normal.   Neck: No signs of thyroid enlargement.   Cardiovascular: Tachycardic rate and regular rhythm. No murmur heard. Normal radial pulses.  Pulmonary/Chest: Breath sounds normal. No wheezes or rales.   Abdominal: Mild tenderness to epigastrium. Negative Francisco's sign. Soft. No distention    Musculoskeletal: Extremities exhibit normal range of motion without edema or tenderness.   Neurological: Awake, alert and oriented to person, place, and time. No focal deficits noted.  Skin: Skin is warm and dry. No rash.   Psychiatric: Normal mood and affect. Appropriate for clinical situation.    DIAGNOSTIC STUDIES / PROCEDURES    LABS  Results for orders placed or performed during the hospital encounter of 01/03/20   CBC WITH " DIFFERENTIAL   Result Value Ref Range    WBC 11.1 (H) 4.8 - 10.8 K/uL    RBC 5.40 4.20 - 5.40 M/uL    Hemoglobin 16.2 (H) 12.0 - 16.0 g/dL    Hematocrit 47.3 (H) 37.0 - 47.0 %    MCV 87.6 81.4 - 97.8 fL    MCH 30.0 27.0 - 33.0 pg    MCHC 34.2 33.6 - 35.0 g/dL    RDW 39.8 35.9 - 50.0 fL    Platelet Count 308 164 - 446 K/uL    MPV 9.0 9.0 - 12.9 fL    Neutrophils-Polys 81.30 (H) 44.00 - 72.00 %    Lymphocytes 10.60 (L) 22.00 - 41.00 %    Monocytes 4.60 0.00 - 13.40 %    Eosinophils 2.80 0.00 - 6.90 %    Basophils 0.40 0.00 - 1.80 %    Immature Granulocytes 0.30 0.00 - 0.90 %    Nucleated RBC 0.00 /100 WBC    Neutrophils (Absolute) 9.00 (H) 2.00 - 7.15 K/uL    Lymphs (Absolute) 1.17 1.00 - 4.80 K/uL    Monos (Absolute) 0.51 0.00 - 0.85 K/uL    Eos (Absolute) 0.31 0.00 - 0.51 K/uL    Baso (Absolute) 0.04 0.00 - 0.12 K/uL    Immature Granulocytes (abs) 0.03 0.00 - 0.11 K/uL    NRBC (Absolute) 0.00 K/uL   COMP METABOLIC PANEL   Result Value Ref Range    Sodium 139 135 - 145 mmol/L    Potassium 3.8 3.6 - 5.5 mmol/L    Chloride 105 96 - 112 mmol/L    Co2 26 20 - 33 mmol/L    Anion Gap 8.0 0.0 - 11.9    Glucose 104 (H) 65 - 99 mg/dL    Bun 9 8 - 22 mg/dL    Creatinine 0.77 0.50 - 1.40 mg/dL    Calcium 9.5 8.5 - 10.5 mg/dL    AST(SGOT) 18 12 - 45 U/L    ALT(SGPT) 20 2 - 50 U/L    Alkaline Phosphatase 59 30 - 99 U/L    Total Bilirubin 0.6 0.1 - 1.5 mg/dL    Albumin 4.4 3.2 - 4.9 g/dL    Total Protein 7.9 6.0 - 8.2 g/dL    Globulin 3.5 1.9 - 3.5 g/dL    A-G Ratio 1.3 g/dL   LIPASE   Result Value Ref Range    Lipase 18 11 - 82 U/L   URINALYSIS CULTURE, IF INDICATED   Result Value Ref Range    Color DK Yellow     Character Cloudy (A)     Specific Gravity 1.024 <1.035    Ph 8.5 (A) 5.0 - 8.0    Glucose Negative Negative mg/dL    Ketones Trace (A) Negative mg/dL    Protein 30 (A) Negative mg/dL    Bilirubin Negative Negative    Urobilinogen, Urine 1.0 Negative    Nitrite Positive (A) Negative    Leukocyte Esterase Large (A)  Negative    Occult Blood Trace (A) Negative    Micro Urine Req Microscopic    TSH   Result Value Ref Range    TSH <0.005 (L) 0.380 - 5.330 uIU/mL   HCG QUAL SERUM   Result Value Ref Range    Beta-Hcg Qualitative Serum Negative Negative   FREE THYROXINE   Result Value Ref Range    Free T-4 0.89 0.53 - 1.43 ng/dL   ESTIMATED GFR   Result Value Ref Range    GFR If African American >60 >60 mL/min/1.73 m 2    GFR If Non African American >60 >60 mL/min/1.73 m 2   URINE MICROSCOPIC (W/UA)   Result Value Ref Range    WBC Packed (A) /hpf    RBC 2-5 (A) /hpf    Bacteria Many (A) None /hpf    Epithelial Cells Few /hpf    Mucous Threads Many /hpf    Hyaline Cast 3-5 (A) /lpf     All labs reviewed by me.    COURSE & MEDICAL DECISION MAKING  Nursing notes, VS, PMSFHx reviewed in chart.     Review of past medical records shows the patient was seen for similar symptoms on 10/02/2019 and admitted. She was diagnosed with hyperthyroidism at that time.      4:55 AM - Patient seen and examined at bedside. Patient will be treated with Zofran 4 mg for nausea and vomiting. Intravenous fluids were administered for vomiting and tachycardia.  Ordered CBC with differential, CMP, Lipase, Urinalysis, TSH, HCG Qual, and Free Thyroxine to evaluate her symptoms. The differential diagnoses include but are not limited to: viral syndrome, electrolyte abnormality, hyperthyroidism.    7:44 AM - Reviewed patient's lab results as shown above. Patient has elevated WBC of 11.1.      7:45 AM - Patient was reevaluated at bedside. She is resting in bed with stable vital signs following IV fluids, but will require further treatment. Discussed lab results with the patient. Discussed admission vs discharge with patient, and she reports to not feel well enough to go home given persistent vomiting. Will plan for admission.      8:12 AM -  I discussed the patient's case and the above findings with Dr. Lo (Hospitalist) who agrees to evaluate the patient for  hospitalization.    HYDRATION: Based on the patient's presentation of Acute Vomiting and Tachycardia the patient was given IV fluids. IV Hydration was used because oral hydration was not adequate alone. Upon recheck following hydration, the patient was stable and requires further treatment.    DISPOSITION:  Patient will be hospitalized by Dr. Lo (Hospitalist) in guarded condition.    FINAL IMPRESSION  1. Urinary tract infection without hematuria, site unspecified    2. Intractable vomiting with nausea, unspecified vomiting type    3. Low TSH level          Jensen WHITAKER ([place]), am scribing for, and in the presence of, Dami Herrera M.D..    Electronically signed by: Jensen Cruz ([place]), 1/3/2020    Dami WHITAKER M.D. personally performed the services described in this documentation, as scribed by Jensen Cruz in my presence, and it is both accurate and complete.    C.    The note accurately reflects work and decisions made by me.  Dami Herrera  1/3/2020  10:47 AM

## 2020-01-03 NOTE — H&P
Hospital Medicine History & Physical Note    Date of Service  1/3/2020    Primary Care Physician  Pcp Pt States None    Consultants  None    Code Status  Full code    Chief Complaint  Nausea vomiting    History of Presenting Illness  24 y.o. female who presented 1/3/2020 with nausea vomiting and dehydration.  The patient is found to have a urinary tract infection.  She is diabetic and at this point will need IV resuscitation as well as antibiotics.  We will obtain urine cultures.  Patient at this point does have tobacco use disorder not counseled on smoking cessation.  Patient has a recent diagnosis of hyperthyroidism with a thyroid nodule.  She is currently getting work-up is now following with endocrinology for continued management.    Review of Systems  Review of Systems   Constitutional: Negative.  Negative for chills, diaphoresis and fever.   HENT: Negative.    Eyes: Negative.  Negative for double vision.   Respiratory: Negative.  Negative for cough, hemoptysis and wheezing.    Cardiovascular: Negative.  Negative for chest pain, palpitations and leg swelling.   Gastrointestinal: Positive for abdominal pain, nausea and vomiting. Negative for blood in stool, constipation, diarrhea and heartburn.   Genitourinary: Positive for dysuria and frequency. Negative for hematuria and urgency.   Musculoskeletal: Positive for myalgias. Negative for joint pain.   Skin: Negative.  Negative for itching and rash.   Neurological: Positive for headaches. Negative for dizziness, focal weakness, seizures and loss of consciousness.   Endo/Heme/Allergies: Negative.  Does not bruise/bleed easily.   Psychiatric/Behavioral: Negative.  Negative for suicidal ideas. The patient is not nervous/anxious.    All other systems reviewed and are negative.      Past Medical History   has a past medical history of ASTHMA (1996), Enteritis (10/3/2019), GBS (group B Streptococcus carrier), +RV culture, currently pregnant (2/10/2014), Headache(784.0),  Hyperthyroidism (10/4/2019), Migraine, Nausea and vomiting (1/3/2020), Strep throat, and Urinary tract infection, site not specified (8/4/13). She also has no past medical history of Addisons disease (HCC), Adrenal disorder (HCC), Allergy, Anemia, Anxiety, Arrhythmia, Arthritis, Asymptomatic human immunodeficiency virus (HIV) infection status ([facility]), Blood transfusion, CATARACT, Clotting disorder (HCC), Cushings syndrome (HCC), EMPHYSEMA, Glaucoma, Goiter, Heart attack (HCC), Heart murmur, HIV (human immunodeficiency virus infection), Hyperlipidemia, Meningitis, Muscle disorder, OSTEOPOROSIS, Osteoporosis, unspecified, Parathyroid disorder (HCC), Pituitary disease (HCC), Seizure (HCC), Sickle cell disease (HCC), Substance abuse (HCC), Tuberculosis, Ulcer, or Unspecified cataract.    Surgical History   has no past surgical history on file.     Family History  family history includes Asthma in her mother; Diabetes in her maternal grandmother; Heart Failure in her father.     Social History   reports that she has been smoking cigarettes. She has a 1.50 pack-year smoking history. She has never used smokeless tobacco. She reports current drug use. She reports that she does not drink alcohol.    Allergies  Allergies   Allergen Reactions   • Clindamycin Hives   • Sulfa Drugs Hives       Medications  None       Physical Exam  Temp:  [36.1 °C (97 °F)] 36.1 °C (97 °F)  Pulse:  [] 65  Resp:  [14] 14  BP: (104-153)/() 107/69  SpO2:  [96 %-98 %] 98 %    Physical Exam  Vitals signs and nursing note reviewed.   Constitutional:       Appearance: Normal appearance. She is well-developed. She is obese.   HENT:      Head: Normocephalic and atraumatic.      Right Ear: External ear normal.      Left Ear: External ear normal.      Nose: Nose normal.      Mouth/Throat:      Mouth: Mucous membranes are moist.      Pharynx: Oropharynx is clear.   Eyes:      Extraocular Movements: Extraocular movements intact.       Conjunctiva/sclera: Conjunctivae normal.      Pupils: Pupils are equal, round, and reactive to light.   Neck:      Musculoskeletal: Normal range of motion and neck supple.      Thyroid: No thyromegaly.      Vascular: No JVD.   Cardiovascular:      Rate and Rhythm: Normal rate and regular rhythm.      Pulses: Normal pulses.      Heart sounds: Normal heart sounds. No murmur.   Pulmonary:      Effort: Pulmonary effort is normal.      Breath sounds: Normal breath sounds. No wheezing or rales.   Chest:      Chest wall: No tenderness.   Abdominal:      General: Abdomen is flat. Bowel sounds are normal. There is no distension.      Palpations: Abdomen is soft. There is no mass.      Tenderness: There is no tenderness. There is no guarding or rebound.   Genitourinary:     Comments: Scott catheter  Musculoskeletal: Normal range of motion.         General: No tenderness or signs of injury.      Left lower leg: No edema.   Lymphadenopathy:      Cervical: No cervical adenopathy.   Skin:     General: Skin is warm and dry.      Capillary Refill: Capillary refill takes 2 to 3 seconds.      Coloration: Skin is not pale.      Findings: No erythema or rash.   Neurological:      General: No focal deficit present.      Mental Status: She is alert and oriented to person, place, and time. Mental status is at baseline.      Cranial Nerves: No cranial nerve deficit.      Sensory: No sensory deficit.      Coordination: Coordination normal.      Deep Tendon Reflexes: Reflexes are normal and symmetric.   Psychiatric:         Mood and Affect: Mood normal.         Behavior: Behavior normal.         Thought Content: Thought content normal.         Judgment: Judgment normal.         Laboratory:  Recent Labs     01/03/20  0455   WBC 11.1*   RBC 5.40   HEMOGLOBIN 16.2*   HEMATOCRIT 47.3*   MCV 87.6   MCH 30.0   MCHC 34.2   RDW 39.8   PLATELETCT 308   MPV 9.0     Recent Labs     01/03/20  0455   SODIUM 139   POTASSIUM 3.8   CHLORIDE 105   CO2 26    GLUCOSE 104*   BUN 9   CREATININE 0.77   CALCIUM 9.5     Recent Labs     01/03/20  0455   ALTSGPT 20   ASTSGOT 18   ALKPHOSPHAT 59   TBILIRUBIN 0.6   LIPASE 18   GLUCOSE 104*         No results for input(s): NTPROBNP in the last 72 hours.      No results for input(s): TROPONINT in the last 72 hours.    Urinalysis:    Recent Labs     01/03/20  0450   SPECGRAVITY 1.024   GLUCOSEUR Negative   KETONES Trace*   NITRITE Positive*   LEUKESTERAS Large*   WBCURINE Packed*   RBCURINE 2-5*   BACTERIA Many*   EPITHELCELL Few        Imaging:  No orders to display         Assessment/Plan:  I anticipate this patient is appropriate for observation status at this time.    Nausea and vomiting  Assessment & Plan  Patient has developed nausea vomiting with dehydration.  Patient will be given antiemetics and fluid resuscitation with normal saline with 20 of potassium and 125 mL/h.    Acute UTI  Assessment & Plan  Patient states she has developed burning at the end of urination as well as frequency of urination.  Patient has an elevated white blood cell count of 11.1  Urine analysis shows nitrite positive leukocyte esterase large WBCs large with a cloudy urine  Patient was placed on IV Rocephin and urine cultures will be followed.      Leukocytosis  Assessment & Plan  Secondary to the urinary tract infection continue to monitor.    Diabetes mellitus type II, controlled ([facility])  Assessment & Plan  -accus with sliding scale coverage  -diabetic diet  -diabetic education  -follow glycohemoglobin levels long term, obtain a hemoglobin A1c level  -continue with oral antihyperglycemics  -monitor for hypoglycemic episodes and adjust control if he should get low    Hyperthyroidism- (present on admission)  Assessment & Plan  Patient is in current work-up and endocrinology follow-up is pending.  She is currently not medicated for her hyperthyroidism.    Tobacco dependence- (present on admission)  Assessment & Plan  -nicotine replacement protocol and  cessation education provided for 12 minutes, discussed options of nicotine patch, acupuncture, medical treatment with wellbutrin and chantix. Discussed other options. Code 72273    Obese- (present on admission)  Assessment & Plan  Body mass index is 32.7 kg/m².  Patient will need outpatient weight loss management program      VTE prophylaxis: SCDs

## 2020-01-03 NOTE — ASSESSMENT & PLAN NOTE
-accus with sliding scale coverage  -diabetic diet  -diabetic education  -follow glycohemoglobin levels long term, obtain a hemoglobin A1c level  -continue with oral antihyperglycemics  -monitor for hypoglycemic episodes and adjust control if he should get low

## 2020-01-03 NOTE — ASSESSMENT & PLAN NOTE
-nicotine replacement protocol and cessation education provided for 12 minutes, discussed options of nicotine patch, acupuncture, medical treatment with wellbutrin and chantix. Discussed other options. Code 26389

## 2020-01-03 NOTE — ED NOTES
Pt ambulatory to RM20, placed in gown and on monitor.  RN agree with triage note, pt states last time she was here in October, she was supposed to f/u with and endocrinologist, but hasn't been able to yet and upon DC she was told if she gets dizzy again to come back.

## 2020-01-03 NOTE — ASSESSMENT & PLAN NOTE
Patient has developed nausea vomiting with dehydration.  Patient will be given antiemetics and fluid resuscitation with normal saline with 20 of potassium and 125 mL/h.

## 2020-01-03 NOTE — ED NOTES
Medication Reconciliation updated and complete per pt at bedside  Allergies reviewed and updated           Pt reports NO Prescription or OTC medications                 Patient has not responded to loss of contact letter

## 2020-01-03 NOTE — PROGRESS NOTES
Pt brought from ED in wheel chair,on arrival pt awake,a&ox4,no c/o pain,nausea,on ivf,poc explained to pt,food served.

## 2020-01-03 NOTE — ASSESSMENT & PLAN NOTE
Patient is in current work-up and endocrinology follow-up is pending.  She is currently not medicated for her hyperthyroidism.

## 2020-01-03 NOTE — ED TRIAGE NOTES
Chief Complaint   Patient presents with   • Nausea/Vomiting/Diarrhea     History of hyperthyrodism, for three days, nothing makes symtoms better or worse

## 2020-01-04 VITALS
HEART RATE: 88 BPM | RESPIRATION RATE: 16 BRPM | HEIGHT: 66 IN | DIASTOLIC BLOOD PRESSURE: 89 MMHG | TEMPERATURE: 97.6 F | SYSTOLIC BLOOD PRESSURE: 140 MMHG | OXYGEN SATURATION: 100 % | WEIGHT: 203.48 LBS | BODY MASS INDEX: 32.7 KG/M2

## 2020-01-04 PROBLEM — N39.0 ACUTE UTI: Status: RESOLVED | Noted: 2020-01-03 | Resolved: 2020-01-04

## 2020-01-04 PROBLEM — E11.9 DIABETES MELLITUS TYPE II, CONTROLLED (HCC): Status: RESOLVED | Noted: 2020-01-03 | Resolved: 2020-01-04

## 2020-01-04 PROBLEM — R11.2 NAUSEA AND VOMITING: Status: RESOLVED | Noted: 2020-01-03 | Resolved: 2020-01-04

## 2020-01-04 PROCEDURE — 700105 HCHG RX REV CODE 258: Performed by: HOSPITALIST

## 2020-01-04 PROCEDURE — 96366 THER/PROPH/DIAG IV INF ADDON: CPT

## 2020-01-04 PROCEDURE — 700101 HCHG RX REV CODE 250: Performed by: HOSPITALIST

## 2020-01-04 PROCEDURE — 99217 PR OBSERVATION CARE DISCHARGE: CPT | Performed by: HOSPITALIST

## 2020-01-04 PROCEDURE — 700111 HCHG RX REV CODE 636 W/ 250 OVERRIDE (IP): Performed by: HOSPITALIST

## 2020-01-04 PROCEDURE — G0378 HOSPITAL OBSERVATION PER HR: HCPCS

## 2020-01-04 RX ORDER — ONDANSETRON 4 MG/1
4 TABLET, ORALLY DISINTEGRATING ORAL EVERY 6 HOURS PRN
Qty: 16 TAB | Refills: 0 | Status: SHIPPED | OUTPATIENT
Start: 2020-01-04 | End: 2020-03-09

## 2020-01-04 RX ORDER — CEFDINIR 300 MG/1
300 CAPSULE ORAL 2 TIMES DAILY
Qty: 10 CAP | Refills: 0 | Status: SHIPPED | OUTPATIENT
Start: 2020-01-04 | End: 2020-01-09

## 2020-01-04 RX ADMIN — CEFTRIAXONE SODIUM 2 G: 2 INJECTION, POWDER, FOR SOLUTION INTRAMUSCULAR; INTRAVENOUS at 05:59

## 2020-01-04 RX ADMIN — POTASSIUM CHLORIDE AND SODIUM CHLORIDE: 900; 150 INJECTION, SOLUTION INTRAVENOUS at 02:01

## 2020-01-04 ASSESSMENT — PATIENT HEALTH QUESTIONNAIRE - PHQ9
2. FEELING DOWN, DEPRESSED, IRRITABLE, OR HOPELESS: NOT AT ALL
SUM OF ALL RESPONSES TO PHQ9 QUESTIONS 1 AND 2: 0
1. LITTLE INTEREST OR PLEASURE IN DOING THINGS: NOT AT ALL

## 2020-01-04 NOTE — PROGRESS NOTES
Assumed pt care, report received POC discussed. Pt resting comfortably unlabored and even breathing,RA. Aox4. Fall precautions in place, call light within reach. All needs met at this time.

## 2020-01-04 NOTE — PROGRESS NOTES
Pt sleeping,no c/o pain,voiding,on ivf,drinking fluids,refusing finger sticks saying she does not have diabetes,explained FS is done to monitor blood sugar levels.

## 2020-01-04 NOTE — DISCHARGE INSTRUCTIONS
Discharge Instructions    Discharged to home by car with relative. Discharged via walking, hospital escort: Yes.  Special equipment needed: Not Applicable    Be sure to schedule a follow-up appointment with your primary care doctor or any specialists as instructed.     Discharge Plan:   Diet Plan: Discussed  Activity Level: Discussed  Confirmed Follow up Appointment: Patient to Call and Schedule Appointment  Confirmed Symptoms Management: Discussed  Medication Reconciliation Updated: Yes  Influenza Vaccine Indication: Patient Refuses    I understand that a diet low in cholesterol, fat, and sodium is recommended for good health. Unless I have been given specific instructions below for another diet, I accept this instruction as my diet prescription.   Other diet: Heart Heathy     Special Instructions: None    · Is patient discharged on Warfarin / Coumadin?   No     Depression / Suicide Risk    As you are discharged from this [facility] facility, it is important to learn how to keep safe from harming yourself.    Recognize the warning signs:  · Abrupt changes in personality, positive or negative- including increase in energy   · Giving away possessions  · Change in eating patterns- significant weight changes-  positive or negative  · Change in sleeping patterns- unable to sleep or sleeping all the time   · Unwillingness or inability to communicate  · Depression  · Unusual sadness, discouragement and loneliness  · Talk of wanting to die  · Neglect of personal appearance   · Rebelliousness- reckless behavior  · Withdrawal from people/activities they love  · Confusion- inability to concentrate     If you or a loved one observes any of these behaviors or has concerns about self-harm, here's what you can do:  · Talk about it- your feelings and reasons for harming yourself  · Remove any means that you might use to hurt yourself (examples: pills, rope, extension cords, firearm)  · Get professional help from the community  (Mental Health, Substance Abuse, psychological counseling)  · Do not be alone:Call your Safe Contact- someone whom you trust who will be there for you.  · Call your local CRISIS HOTLINE 345-7456 or 939-494-3919  · Call your local Children's Mobile Crisis Response Team [place] (007) 379-5637 or www.Telogis  · Call the toll free National Suicide Prevention Hotlines   · National Suicide Prevention Lifeline 576-743-YBHP (3776)  · [place] [facility] Line Network 800-SUICIDE (695-8485)      Discharge Instructions per GISELA Jasmine    MEDICATION: You are being prescribed cefdinir for the treatment of your UTI.  Please take this medication as prescribed and in completion.     DIET: Regular diet as tolerated. Increase fluid intake .     ACTIVITY: No restrictions.  Continue as tolerated    DIAGNOSIS: Acute Uncomplicated Cystitis.      Urinary Tract Infection, Adult  Introduction  A urinary tract infection (UTI) is an infection of any part of the urinary tract. The urinary tract includes the:  · Kidneys.  · Ureters.  · Bladder.  · Urethra.  These organs make, store, and get rid of pee (urine) in the body.  Follow these instructions at home:  · Take over-the-counter and prescription medicines only as told by your doctor.  · If you were prescribed an antibiotic medicine, take it as told by your doctor. Do not stop taking the antibiotic even if you start to feel better.  · Avoid the following drinks:  ¨ Alcohol.  ¨ Caffeine.  ¨ Tea.  ¨ Carbonated drinks.  · Drink enough fluid to keep your pee clear or pale yellow.  · Keep all follow-up visits as told by your doctor. This is important.  · Make sure to:  ¨ Empty your bladder often and completely. Do not to hold pee for long periods of time.  ¨ Empty your bladder before and after sex.  ¨ Wipe from front to back after a bowel movement if you are female. Use each tissue one time when you wipe.  Contact a doctor if:  · You have back pain.  · You have a  fever.  · You feel sick to your stomach (nauseous).  · You throw up (vomit).  · Your symptoms do not get better after 3 days.  · Your symptoms go away and then come back.  Get help right away if:  · You have very bad back pain.  · You have very bad lower belly (abdominal) pain.  · You are throwing up and cannot keep down any medicines or water.  This information is not intended to replace advice given to you by your health care provider. Make sure you discuss any questions you have with your health care provider.  Document Released: 06/05/2009 Document Revised: 05/25/2017 Document Reviewed: 11/07/2016  © 2017 Elsevier

## 2020-01-04 NOTE — DISCHARGE SUMMARY
Discharge Summary    CHIEF COMPLAINT ON ADMISSION  Chief Complaint   Patient presents with   • Nausea/Vomiting/Diarrhea     History of hyperthyrodism, for three days, nothing makes symtoms better or worse       Reason for Admission  N/V     Admission Date  1/3/2020    CODE STATUS  Full Code    HPI & HOSPITAL COURSE  Brianna Ruby is a 24 y.o. female who presented 1/3/2020 with nausea vomiting and dehydration.  The patient is found to have a urinary tract infection.  She is diabetic and at this point will need IV resuscitation as well as antibiotics.     She was admitted to the CDU for IV fluids, IV antibiotics and antiemetics.  Urine culture was collected and pending.  The following morning, patient reported resolution of nausea and vomiting, and was tolerating a regular diet.  She reported no complaints of pain, fever, chills or diaphoresis.  She was discharged with instructions to complete a course of cefdinir and follow-up with her primary care provider in 1 week.        Therefore, she is discharged in fair and stable condition to home with close outpatient follow-up.    The patient recovered much more quickly than anticipated on admission.    Discharge Date  01/04/2020    FOLLOW UP ITEMS POST DISCHARGE  Follow-up with PCP in 1 week     DISCHARGE DIAGNOSES  Active Problems:    Hyperthyroidism POA: Yes    Leukocytosis POA: Yes    Obese POA: Yes    Tobacco dependence POA: Yes  Resolved Problems:    Acute UTI POA: Unknown    Nausea and vomiting POA: Yes    Diabetes mellitus type II, controlled (HCC) POA: Unknown      FOLLOW UP  No future appointments.  No follow-up provider specified.    MEDICATIONS ON DISCHARGE     Medication List      START taking these medications      Instructions   cefdinir 300 MG Caps  Commonly known as:  OMNICEF   Take 1 Cap by mouth 2 times a day for 5 days.  Dose:  300 mg     ondansetron 4 MG Tbdp  Commonly known as:  ZOFRAN ODT   Take 1 Tab by mouth every 6 hours as needed for  Nausea.  Dose:  4 mg            Allergies  Allergies   Allergen Reactions   • Clindamycin Hives   • Sulfa Drugs Hives       DIET  Orders Placed This Encounter   Procedures   • Diet Order Regular     Standing Status:   Standing     Number of Occurrences:   1     Order Specific Question:   Diet:     Answer:   Regular [1]       ACTIVITY  As tolerated.  Weight bearing as tolerated    CONSULTATIONS  None     PROCEDURES  None    LABORATORY  Lab Results   Component Value Date    SODIUM 139 01/03/2020    POTASSIUM 3.8 01/03/2020    CHLORIDE 105 01/03/2020    CO2 26 01/03/2020    GLUCOSE 104 (H) 01/03/2020    BUN 9 01/03/2020    CREATININE 0.77 01/03/2020        Lab Results   Component Value Date    WBC 11.1 (H) 01/03/2020    HEMOGLOBIN 16.2 (H) 01/03/2020    HEMATOCRIT 47.3 (H) 01/03/2020    PLATELETCT 308 01/03/2020

## 2020-01-04 NOTE — CARE PLAN
Problem: Safety  Goal: Free from accidental injury  Outcome: PROGRESSING AS EXPECTED     Problem: Infection  Goal: Infection controlled or resolved  Outcome: PROGRESSING AS EXPECTED

## 2020-01-04 NOTE — PROGRESS NOTES
Rounding: In no signs of pain or distress. Will continue to monitor. No pain at this time. Denies any symptoms. IV infusing per MAR

## 2020-01-05 LAB
BACTERIA UR CULT: ABNORMAL
BACTERIA UR CULT: ABNORMAL
SIGNIFICANT IND 70042: ABNORMAL
SITE SITE: ABNORMAL
SOURCE SOURCE: ABNORMAL

## 2020-03-09 ENCOUNTER — HOSPITAL ENCOUNTER (EMERGENCY)
Facility: MEDICAL CENTER | Age: 25
End: 2020-03-09
Attending: EMERGENCY MEDICINE
Payer: MEDICAID

## 2020-03-09 ENCOUNTER — APPOINTMENT (OUTPATIENT)
Dept: RADIOLOGY | Facility: MEDICAL CENTER | Age: 25
End: 2020-03-09
Attending: EMERGENCY MEDICINE
Payer: MEDICAID

## 2020-03-09 VITALS
WEIGHT: 194 LBS | OXYGEN SATURATION: 98 % | DIASTOLIC BLOOD PRESSURE: 76 MMHG | RESPIRATION RATE: 16 BRPM | HEART RATE: 85 BPM | TEMPERATURE: 98.6 F | SYSTOLIC BLOOD PRESSURE: 125 MMHG | BODY MASS INDEX: 31.31 KG/M2

## 2020-03-09 DIAGNOSIS — K52.9 ENTERITIS: ICD-10-CM

## 2020-03-09 DIAGNOSIS — N30.00 ACUTE CYSTITIS WITHOUT HEMATURIA: ICD-10-CM

## 2020-03-09 DIAGNOSIS — R10.13 EPIGASTRIC PAIN: ICD-10-CM

## 2020-03-09 LAB
ALBUMIN SERPL BCP-MCNC: 4.1 G/DL (ref 3.2–4.9)
ALBUMIN/GLOB SERPL: 1 G/DL
ALP SERPL-CCNC: 56 U/L (ref 30–99)
ALT SERPL-CCNC: 13 U/L (ref 2–50)
ANION GAP SERPL CALC-SCNC: 7 MMOL/L (ref 0–11.9)
APPEARANCE UR: ABNORMAL
AST SERPL-CCNC: 12 U/L (ref 12–45)
BACTERIA #/AREA URNS HPF: ABNORMAL /HPF
BASOPHILS # BLD AUTO: 0.9 % (ref 0–1.8)
BASOPHILS # BLD: 0.09 K/UL (ref 0–0.12)
BILIRUB SERPL-MCNC: 0.3 MG/DL (ref 0.1–1.5)
BILIRUB UR QL STRIP.AUTO: NEGATIVE
BUN SERPL-MCNC: 11 MG/DL (ref 8–22)
CALCIUM SERPL-MCNC: 10 MG/DL (ref 8.5–10.5)
CHLORIDE SERPL-SCNC: 105 MMOL/L (ref 96–112)
CO2 SERPL-SCNC: 24 MMOL/L (ref 20–33)
COLOR UR: YELLOW
CREAT SERPL-MCNC: 0.71 MG/DL (ref 0.5–1.4)
EKG IMPRESSION: NORMAL
EOSINOPHIL # BLD AUTO: 0.17 K/UL (ref 0–0.51)
EOSINOPHIL NFR BLD: 1.7 % (ref 0–6.9)
EPI CELLS #/AREA URNS HPF: ABNORMAL /HPF
ERYTHROCYTE [DISTWIDTH] IN BLOOD BY AUTOMATED COUNT: 40 FL (ref 35.9–50)
GLOBULIN SER CALC-MCNC: 4.1 G/DL (ref 1.9–3.5)
GLUCOSE SERPL-MCNC: 114 MG/DL (ref 65–99)
GLUCOSE UR STRIP.AUTO-MCNC: NEGATIVE MG/DL
HCG SERPL QL: NEGATIVE
HCT VFR BLD AUTO: 41.7 % (ref 37–47)
HGB BLD-MCNC: 14.2 G/DL (ref 12–16)
HYALINE CASTS #/AREA URNS LPF: ABNORMAL /LPF
KETONES UR STRIP.AUTO-MCNC: ABNORMAL MG/DL
LEUKOCYTE ESTERASE UR QL STRIP.AUTO: ABNORMAL
LIPASE SERPL-CCNC: 25 U/L (ref 11–82)
LYMPHOCYTES # BLD AUTO: 2.49 K/UL (ref 1–4.8)
LYMPHOCYTES NFR BLD: 24.4 % (ref 22–41)
MANUAL DIFF BLD: NORMAL
MCH RBC QN AUTO: 29.7 PG (ref 27–33)
MCHC RBC AUTO-ENTMCNC: 34.1 G/DL (ref 33.6–35)
MCV RBC AUTO: 87.2 FL (ref 81.4–97.8)
MICRO URNS: ABNORMAL
MONOCYTES # BLD AUTO: 0.44 K/UL (ref 0–0.85)
MONOCYTES NFR BLD AUTO: 4.3 % (ref 0–13.4)
MORPHOLOGY BLD-IMP: NORMAL
MUCOUS THREADS #/AREA URNS HPF: ABNORMAL /HPF
NEUTROPHILS # BLD AUTO: 7.01 K/UL (ref 2–7.15)
NEUTROPHILS NFR BLD: 68.7 % (ref 44–72)
NITRITE UR QL STRIP.AUTO: POSITIVE
NRBC # BLD AUTO: 0 K/UL
NRBC BLD-RTO: 0 /100 WBC
PH UR STRIP.AUTO: 6.5 [PH] (ref 5–8)
PLATELET # BLD AUTO: 353 K/UL (ref 164–446)
PLATELET BLD QL SMEAR: NORMAL
PMV BLD AUTO: 9 FL (ref 9–12.9)
POTASSIUM SERPL-SCNC: 3.7 MMOL/L (ref 3.6–5.5)
PROT SERPL-MCNC: 8.2 G/DL (ref 6–8.2)
PROT UR QL STRIP: NEGATIVE MG/DL
RBC # BLD AUTO: 4.78 M/UL (ref 4.2–5.4)
RBC # URNS HPF: ABNORMAL /HPF
RBC BLD AUTO: NORMAL
RBC UR QL AUTO: NEGATIVE
SODIUM SERPL-SCNC: 136 MMOL/L (ref 135–145)
SP GR UR STRIP.AUTO: 1.03
T4 FREE SERPL-MCNC: 0.87 NG/DL (ref 0.53–1.43)
TSH SERPL DL<=0.005 MIU/L-ACNC: <0.005 UIU/ML (ref 0.38–5.33)
UROBILINOGEN UR STRIP.AUTO-MCNC: 1 MG/DL
WBC # BLD AUTO: 10.2 K/UL (ref 4.8–10.8)
WBC #/AREA URNS HPF: ABNORMAL /HPF

## 2020-03-09 PROCEDURE — 84443 ASSAY THYROID STIM HORMONE: CPT

## 2020-03-09 PROCEDURE — 96375 TX/PRO/DX INJ NEW DRUG ADDON: CPT

## 2020-03-09 PROCEDURE — A9270 NON-COVERED ITEM OR SERVICE: HCPCS | Performed by: EMERGENCY MEDICINE

## 2020-03-09 PROCEDURE — 700105 HCHG RX REV CODE 258: Performed by: EMERGENCY MEDICINE

## 2020-03-09 PROCEDURE — 93005 ELECTROCARDIOGRAM TRACING: CPT | Performed by: EMERGENCY MEDICINE

## 2020-03-09 PROCEDURE — 81001 URINALYSIS AUTO W/SCOPE: CPT

## 2020-03-09 PROCEDURE — 87086 URINE CULTURE/COLONY COUNT: CPT

## 2020-03-09 PROCEDURE — 84703 CHORIONIC GONADOTROPIN ASSAY: CPT

## 2020-03-09 PROCEDURE — 80053 COMPREHEN METABOLIC PANEL: CPT

## 2020-03-09 PROCEDURE — 99285 EMERGENCY DEPT VISIT HI MDM: CPT

## 2020-03-09 PROCEDURE — 83690 ASSAY OF LIPASE: CPT

## 2020-03-09 PROCEDURE — 700117 HCHG RX CONTRAST REV CODE 255: Performed by: EMERGENCY MEDICINE

## 2020-03-09 PROCEDURE — 87077 CULTURE AEROBIC IDENTIFY: CPT

## 2020-03-09 PROCEDURE — 96365 THER/PROPH/DIAG IV INF INIT: CPT | Mod: XU

## 2020-03-09 PROCEDURE — 85027 COMPLETE CBC AUTOMATED: CPT

## 2020-03-09 PROCEDURE — 87186 SC STD MICRODIL/AGAR DIL: CPT

## 2020-03-09 PROCEDURE — 84439 ASSAY OF FREE THYROXINE: CPT

## 2020-03-09 PROCEDURE — 700102 HCHG RX REV CODE 250 W/ 637 OVERRIDE(OP): Performed by: EMERGENCY MEDICINE

## 2020-03-09 PROCEDURE — 36415 COLL VENOUS BLD VENIPUNCTURE: CPT

## 2020-03-09 PROCEDURE — 85007 BL SMEAR W/DIFF WBC COUNT: CPT

## 2020-03-09 PROCEDURE — 700111 HCHG RX REV CODE 636 W/ 250 OVERRIDE (IP): Performed by: EMERGENCY MEDICINE

## 2020-03-09 PROCEDURE — 74177 CT ABD & PELVIS W/CONTRAST: CPT

## 2020-03-09 PROCEDURE — 93005 ELECTROCARDIOGRAM TRACING: CPT

## 2020-03-09 RX ORDER — CEFDINIR 300 MG/1
300 CAPSULE ORAL 2 TIMES DAILY
Qty: 14 CAP | Refills: 0 | Status: SHIPPED | OUTPATIENT
Start: 2020-03-09 | End: 2020-03-16

## 2020-03-09 RX ORDER — ONDANSETRON 2 MG/ML
4 INJECTION INTRAMUSCULAR; INTRAVENOUS ONCE
Status: COMPLETED | OUTPATIENT
Start: 2020-03-09 | End: 2020-03-09

## 2020-03-09 RX ORDER — OXYCODONE HYDROCHLORIDE AND ACETAMINOPHEN 5; 325 MG/1; MG/1
2 TABLET ORAL ONCE
Status: COMPLETED | OUTPATIENT
Start: 2020-03-09 | End: 2020-03-09

## 2020-03-09 RX ORDER — MORPHINE SULFATE 10 MG/ML
6 INJECTION, SOLUTION INTRAMUSCULAR; INTRAVENOUS ONCE
Status: COMPLETED | OUTPATIENT
Start: 2020-03-09 | End: 2020-03-09

## 2020-03-09 RX ADMIN — CEFTRIAXONE SODIUM 2 G: 2 INJECTION, POWDER, FOR SOLUTION INTRAMUSCULAR; INTRAVENOUS at 20:18

## 2020-03-09 RX ADMIN — IOHEXOL 100 ML: 350 INJECTION, SOLUTION INTRAVENOUS at 19:50

## 2020-03-09 RX ADMIN — ONDANSETRON 4 MG: 2 INJECTION INTRAMUSCULAR; INTRAVENOUS at 21:24

## 2020-03-09 RX ADMIN — OXYCODONE HYDROCHLORIDE AND ACETAMINOPHEN 2 TABLET: 5; 325 TABLET ORAL at 23:30

## 2020-03-09 RX ADMIN — MORPHINE SULFATE 6 MG: 10 INJECTION INTRAVENOUS at 21:24

## 2020-03-09 RX ADMIN — LIDOCAINE HYDROCHLORIDE 30 ML: 20 SOLUTION OROPHARYNGEAL at 19:30

## 2020-03-09 ASSESSMENT — ENCOUNTER SYMPTOMS
BLOOD IN STOOL: 0
BACK PAIN: 1
DIARRHEA: 1
ABDOMINAL PAIN: 1

## 2020-03-09 ASSESSMENT — FIBROSIS 4 INDEX: FIB4 SCORE: 0.3

## 2020-03-09 NOTE — LETTER
3/13/2020               Brianna Ruby  [address] [place] [address]        Dear Brianna (MR#[id number])    This letter is sent in regards to your, recent visit to the [facility] Department on 3/9/2020.  During the visit, tests were performed to assist the physician in a medical diagnosis.  A review of those tests requires that we notify you of the following:    Your urine culture was POSITIVE for a bacteria called Escherichia coli. The antibiotic prescribed for you (cefdinir) should be active to treat this bacteria. IT IS IMPORTANT THAT YOU CONTINUE TAKING YOUR ANTIBIOTIC UNTIL IT IS FINISHED.      Please feel free to contact me at the number below if you have any questions or concerns. Thank you for your cooperation in the matter.    Sincerely,  ED Culture Follow-Up Staff  Mamie Lora, PharmD, PharmD    [facility], Emergency Department  [address] [place] [address] [id number] [phone] (ED Culture Line)  [phone]

## 2020-03-09 NOTE — ED TRIAGE NOTES
Ambulates to triage  Chief Complaint   Patient presents with   • Abdominal Pain     mid abd pain radiates to back     Pain is constant, denies n/v, pt drinking [employer] in triage, advised nothing else to eat or drink. Given specimen cup for urine sample.

## 2020-03-10 NOTE — DISCHARGE INSTRUCTIONS
CT results:  Apparent mild bowel wall thickening/hyperenhancement involving loops of small bowel in the left mid abdomen suggestive of enteritis possibly due to infection or inflammatory bowel disease.     Question of urinary bladder wall thickening with mild surrounding inflammatory change could indicate urinary tract infection.     Trace amount of pelvic free fluid.    Please follow up with a primary physician for blood pressure management, diabetic screening, and all other preventive health concerns.

## 2020-03-10 NOTE — ED PROVIDER NOTES
ED Provider Note    Scribed for Areli Munroe M.D. by Chloe Gallardo. 3/9/2020, 7:10 PM.    Primary care provider: None  Means of arrival: walkin  History obtained from: Patient  History limited by: none    CHIEF COMPLAINT  Chief Complaint   Patient presents with   • Abdominal Pain     mid abd pain radiates to back       HPI  Brianna Ruby is a 24 y.o. female who presents to the Emergency Department for worsening intermittent mid-abdominal pain the last few weeks. Patient has had this randomly the last few years and has been seen for this in the past. She was told at the time that she had GERD and had radiology done. This revealed inflammation in her intestines and that she may have celiac disease. During this evaluation, she was referred to an endocrinologist after finding a mass in her thyroid. She has not seen a GI specialist for symptoms. Patient presents today for worsening pain. Pain is to mid-abdomen, but now radiates to right side. It also shoots up chest and between shoulder blades. She describes this as a contraction and as if she is cramping. Discomfort exacerbated with standing up, sitting down, palpation, inhalation, and ambulation. Associated diarrhea. Has not tried any over the counter medication or TUMS for symptoms. No blood in stool or hematuria. History of thyroid mass. Currently being followed for this by endocrinologist, but is not on any medications. Goes to [employer]. No abdominal surgeries. LMP 2/7/20. Allergic to sulfa drugs and clindamycin. No recent antibiotics.    REVIEW OF SYSTEMS  Review of Systems   Cardiovascular: Positive for chest pain.   Gastrointestinal: Positive for abdominal pain and diarrhea. Negative for blood in stool.   Genitourinary: Negative for hematuria.   Musculoskeletal: Positive for back pain.   All other systems reviewed and are negative.      PAST MEDICAL HISTORY   has a past medical history of ASTHMA (1996), Enteritis (10/3/2019), GBS (group B Streptococcus  carrier), +RV culture, currently pregnant (2/10/2014), Headache(784.0), Hyperthyroidism (10/4/2019), Migraine, Nausea and vomiting (1/3/2020), Strep throat, and Urinary tract infection, site not specified (8/4/13).    SURGICAL HISTORY  patient denies any surgical history    SOCIAL HISTORY  Social History     Tobacco Use   • Smoking status: Current Every Day Smoker     Packs/day: 0.25     Years: 6.00     Pack years: 1.50     Types: Cigarettes     Last attempt to quit: 8/1/2016     Years since quitting: 3.6   • Smokeless tobacco: Never Used   Substance Use Topics   • Alcohol use: No   • Drug use: Not Currently     Comment: THC      Social History     Substance and Sexual Activity   Drug Use Not Currently    Comment: THC       FAMILY HISTORY  Family History   Problem Relation Age of Onset   • Asthma Mother    • Heart Failure Father    • Diabetes Maternal Grandmother        CURRENT MEDICATIONS  Reviewed.  See Encounter Summary.     ALLERGIES  Allergies   Allergen Reactions   • Clindamycin Hives   • Sulfa Drugs Hives       PHYSICAL EXAM  VITAL SIGNS: /87   Pulse 97   Temp 37 °C (98.6 °F) (Temporal)   Resp 14   Wt 88 kg (194 lb 0.1 oz)   LMP 02/07/2020   SpO2 98%   BMI 31.31 kg/m²   Constitutional: Alert in no apparent distress.  HENT: No signs of trauma, Bilateral external ears normal, Nose normal.   Eyes: Pupils are equal and reactive, Conjunctiva normal, Non-icteric.   Neck: Normal range of motion, No tenderness, Supple, No stridor.   Lymphatic: No lymphadenopathy noted.   Cardiovascular: Regular rate and rhythm, no murmurs.   Thorax & Lungs: Normal breath sounds, No respiratory distress, No wheezing, No chest tenderness.   Abdomen: Soft, epigastric pain, No masses, No pulsatile masses. No peritoneal signs.  Skin: Warm, Dry, No erythema, No rash.   Back: No bony tenderness, No CVA tenderness.   Extremities: Intact distal pulses, No edema, No tenderness, No cyanosis  Musculoskeletal: Good range of motion in  all major joints. No tenderness to palpation or major deformities noted.   Neurologic: Alert , Normal motor function, Normal sensory function, No focal deficits noted.   Psychiatric: Affect normal, Judgment normal, Mood normal.     DIAGNOSTIC STUDIES / PROCEDURES   LABS  Results for orders placed or performed during the hospital encounter of 03/09/20   CBC WITH DIFFERENTIAL   Result Value Ref Range    WBC 10.2 4.8 - 10.8 K/uL    RBC 4.78 4.20 - 5.40 M/uL    Hemoglobin 14.2 12.0 - 16.0 g/dL    Hematocrit 41.7 37.0 - 47.0 %    MCV 87.2 81.4 - 97.8 fL    MCH 29.7 27.0 - 33.0 pg    MCHC 34.1 33.6 - 35.0 g/dL    RDW 40.0 35.9 - 50.0 fL    Platelet Count 353 164 - 446 K/uL    MPV 9.0 9.0 - 12.9 fL    Neutrophils-Polys 68.70 44.00 - 72.00 %    Lymphocytes 24.40 22.00 - 41.00 %    Monocytes 4.30 0.00 - 13.40 %    Eosinophils 1.70 0.00 - 6.90 %    Basophils 0.90 0.00 - 1.80 %    Nucleated RBC 0.00 /100 WBC    Neutrophils (Absolute) 7.01 2.00 - 7.15 K/uL    Lymphs (Absolute) 2.49 1.00 - 4.80 K/uL    Monos (Absolute) 0.44 0.00 - 0.85 K/uL    Eos (Absolute) 0.17 0.00 - 0.51 K/uL    Baso (Absolute) 0.09 0.00 - 0.12 K/uL    NRBC (Absolute) 0.00 K/uL   COMP METABOLIC PANEL   Result Value Ref Range    Sodium 136 135 - 145 mmol/L    Potassium 3.7 3.6 - 5.5 mmol/L    Chloride 105 96 - 112 mmol/L    Co2 24 20 - 33 mmol/L    Anion Gap 7.0 0.0 - 11.9    Glucose 114 (H) 65 - 99 mg/dL    Bun 11 8 - 22 mg/dL    Creatinine 0.71 0.50 - 1.40 mg/dL    Calcium 10.0 8.5 - 10.5 mg/dL    AST(SGOT) 12 12 - 45 U/L    ALT(SGPT) 13 2 - 50 U/L    Alkaline Phosphatase 56 30 - 99 U/L    Total Bilirubin 0.3 0.1 - 1.5 mg/dL    Albumin 4.1 3.2 - 4.9 g/dL    Total Protein 8.2 6.0 - 8.2 g/dL    Globulin 4.1 (H) 1.9 - 3.5 g/dL    A-G Ratio 1.0 g/dL   LIPASE   Result Value Ref Range    Lipase 25 11 - 82 U/L   HCG QUAL SERUM   Result Value Ref Range    Beta-Hcg Qualitative Serum Negative Negative   URINALYSIS,CULTURE IF INDICATED   Result Value Ref Range     Color Yellow     Character Cloudy (A)     Specific Gravity 1.029 <1.035    Ph 6.5 5.0 - 8.0    Glucose Negative Negative mg/dL    Ketones Trace (A) Negative mg/dL    Protein Negative Negative mg/dL    Bilirubin Negative Negative    Urobilinogen, Urine 1.0 Negative    Nitrite Positive (A) Negative    Leukocyte Esterase Moderate (A) Negative    Occult Blood Negative Negative    Micro Urine Req Microscopic    DIFFERENTIAL MANUAL   Result Value Ref Range    Manual Diff Status PERFORMED    PERIPHERAL SMEAR REVIEW   Result Value Ref Range    Peripheral Smear Review see below    PLATELET ESTIMATE   Result Value Ref Range    Plt Estimation Normal    MORPHOLOGY   Result Value Ref Range    RBC Morphology Normal    ESTIMATED GFR   Result Value Ref Range    GFR If African American >60 >60 mL/min/1.73 m 2    GFR If Non African American >60 >60 mL/min/1.73 m 2   TSH WITH REFLEX TO FT4   Result Value Ref Range    TSH <0.005 (L) 0.380 - 5.330 uIU/mL   URINE MICROSCOPIC (W/UA)   Result Value Ref Range    WBC 10-20 (A) /hpf    RBC 0-2 /hpf    Bacteria Many (A) None /hpf    Epithelial Cells Few /hpf    Mucous Threads Few /hpf    Hyaline Cast 0-2 /lpf   FREE THYROXINE   Result Value Ref Range    Free T-4 0.87 0.53 - 1.43 ng/dL   EKG (NOW)   Result Value Ref Range    Report       [facility] Emergency Dept.    Test Date:  2020  Pt Name:    HARINDER BRADFORD               Department: ER  MRN:        [id number]                      Room:  Gender:     Female                       Technician: [id number]  :        1995                   Requested By:ER TRIAGE PROTOCOL  Order #:    [id number]                    Reading MD: Areli Munroe MD    Measurements  Intervals                                Axis  Rate:       80                           P:          36  NY:         140                          QRS:        35  QRSD:       78                           T:          33  QT:         360  QTc:        416    Interpretive  Statements  SINUS RHYTHM  No ectopy. Normal axis and intervals. No significant ST or T wave changes.  Compared to ECG 10/02/2019 10:44:19  No significant changes  Electronically Signed On 3-9-2020 19:08:14 PDT by Areli Munroe MD     All labs were reviewed by me.    EKG Time completed  Interpreted by me, as shown above.    RADIOLOGY  CT-ABDOMEN-PELVIS WITH   Final Result      Apparent mild bowel wall thickening/hyperenhancement involving loops of small bowel in the left mid abdomen suggestive of enteritis possibly due to infection or inflammatory bowel disease.      Question of urinary bladder wall thickening with mild surrounding inflammatory change could indicate urinary tract infection.      Trace amount of pelvic free fluid.      The radiologist's interpretation of all radiological studies have been reviewed by me.    COURSE & MEDICAL DECISION MAKING  Pertinent Labs & Imaging studies reviewed. (See chart for details)    7:10 PM - Patient seen and examined at bedside. Patient will be treated with 30ml GI cocktail. Ordered abdomen CT, EKG, cbc with differential, comp metabolic panel, and other labs to evaluate her symptoms.     8:47 PM Patient reevaluated at bedside. Patient feeling improved, is resting comfortably, and is in no acute distress. Discussed resulted studies.     11:28 PM Patient reevaluated at bedside. Patient feeling improved, is resting comfortably, and is in no acute distress. Discussed resulted studies. Discussed plan for discharge; I advised the patient to follow-up with primary care physician, and to return to the [facility] ED with any new or worsening symptoms, including fever. Patient was given the opportunity for questions. I addressed all questions or concerns at this time and they verbalize agreement to the plan of care.    Decision Making:  This is a 24 y.o. year old female who presents with abdominal pain which is been present intermittently for the past 2 years.  On my examination, the  patient very mild epigastric discomfort.  She reported concomitant nausea.  Differential diagnosis includes but is not limited to gastritis, GERD, gastroenteritis, functional abdominal pain, medication noncompliance, pregnancy, electrolyte abnormality, dehydration, viral syndrome, dysrhythmia, thyroid abnormality    IV access was obtained and laboratory studies were drawn.  The patient was initially treated with a GI cocktail with minimal response.  She was subsequently given morphine and Zofran with significant relief of her symptoms.  Labs are largely markable without significant leukocytosis, anemia, or electrolyte disturbance.  Lipase was not elevated to suggest pancreatitis.  Patient no transaminitis.  Urinalysis was concerning for infection with pyuria, bacteria, nitrite positivity.  Thyroid screening was performed with a TSH which was again less than 0.005.  Free T4 was within normal limits.    EKG was performed and showed no evidence of ectopy, dysrhythmia, or ischemia/infarction.  CT was performed showing apparent mild bowel wall thickening involving loops of small bowel in the left mid abdomen consistent with enteritis.  Patient also had evidence of a UTI on her CT.    Feel the patient symptoms are likely secondary to UTI as well as possible enteritis.  Patient has been having abdominal pain intermittently for the past 2 years, and I advised her to follow-up with gastroenterology, as I feel that this would be of the most benefit to her.  At this time, I will treat her for her urinary tract infection with ceftriaxone in the emergency department and a prescription for oral Omnicef.  I advised her to follow-up with her primary care doctor as well as gastroenterology for further evaluation and management.  Patient did request narcotic pain medications and given the patient's symptomatology, do not feel this would be appropriate.  Explained this to the patient and she expressed understanding.    The patient  will return for new or worsening symptoms and is stable at the time of discharge.    The patient is referred to a primary physician for blood pressure management, diabetic screening, and for all other preventative health concerns.    DISPOSITION:  Patient will be discharged home in stable condition.    FOLLOW UP:  [facility]  [address] [place] [id number]  [phone]  Schedule an appointment as soon as possible for a visit       [facility]  [address] [facility] [address] [phone]  Schedule an appointment as soon as possible for a visit         OUTPATIENT MEDICATIONS:  New Prescriptions    CEFDINIR (OMNICEF) 300 MG CAP    Take 1 Cap by mouth 2 times a day for 7 days.     FINAL IMPRESSION  1. Acute cystitis without hematuria    2. Epigastric pain    3. Enteritis       Chloe WHITAKER ([place]), am scribing for, and in the presence of, Areli Munroe M.D.    Electronically signed by: Chloe Gallardo ([place]), 3/9/2020    Areli WHITAKER M.D. personally performed the services described in this documentation, as scribed by Chloe Gallardo in my presence, and it is both accurate and complete.    The note accurately reflects work and decisions made by me.  Areli Munroe M.D.  3/10/2020  3:05 AM    C

## 2020-03-10 NOTE — ED NOTES
Pt tolerated PO challenge well.    Pt is asking for pain medication prior to discharge. Will call MD to update.

## 2020-03-14 ENCOUNTER — TELEPHONE (OUTPATIENT)
Dept: PHARMACY | Facility: MEDICAL CENTER | Age: 25
End: 2020-03-14

## 2020-03-14 NOTE — ED NOTES
ED Positive Culture Follow-up/Notification Note:    Date: 3/13/2020     Patient seen in the ED on 3/9/2020 for intermittent abdominal pain x few weeks.   1. Acute cystitis without hematuria    2. Epigastric pain    3. Enteritis       Discharge Medication List as of 3/9/2020 11:27 PM      START taking these medications    Details   cefdinir (OMNICEF) 300 MG Cap Take 1 Cap by mouth 2 times a day for 7 days., Disp-14 Cap, R-0, Normal             Allergies: Clindamycin and Sulfa drugs     Vitals:    03/09/20 1444 03/09/20 1606 03/09/20 2130 03/09/20 2330   BP:  129/87 114/78 125/76   Pulse:  97 90 85   Resp:  14 16 16   Temp:  37 °C (98.6 °F)     TempSrc:  Temporal     SpO2:  98% 98% 98%   Weight: 88 kg (194 lb 0.1 oz)          Final cultures:   Results     Procedure Component Value Units Date/Time    URINE CULTURE(NEW) [[id number]]  (Abnormal)  (Susceptibility) Collected:  03/09/20 1840    Order Status:  Completed Specimen:  Urine Updated:  03/12/20 0903     Significant Indicator POS     Source UR     Site -     Culture Result Mixed skin dom <10,000 cfu/mL      Escherichia coli  >100,000 cfu/mL      Susceptibility     Escherichia coli (1)     Antibiotic Interpretation Microscan Method Status    Ampicillin Sensitive <=8 mcg/mL NANCY Final    Ceftriaxone Sensitive <=1 mcg/mL NANCY Final    Ceftazidime Sensitive <=1 mcg/mL NANCY Final    Cefotaxime Sensitive <=2 mcg/mL NANCY Final    Cefazolin Sensitive <=2 mcg/mL NANCY Final    Ciprofloxacin Sensitive <=1 mcg/mL NANCY Final    Ampicillin/sulbactam Sensitive <=8/4 mcg/mL NANCY Final    Cefepime Sensitive <=2 mcg/mL NANCY Final    Tobramycin Sensitive <=4 mcg/mL NANCY Final    Cefotetan Sensitive <=16 mcg/mL NANCY Final    Nitrofurantoin Sensitive <=32 mcg/mL NANCY Final    Gentamicin Sensitive <=4 mcg/mL NANCY Final    Levofloxacin Sensitive <=2 mcg/mL NANCY Final    Pip/Tazobactam Sensitive <=16 mcg/mL NANCY Final    Trimeth/Sulfa Sensitive <=2/38 mcg/mL NANCY Final                    URINALYSIS,CULTURE IF INDICATED [[id number]]  (Abnormal) Collected:  03/09/20 1840    Order Status:  Completed Specimen:  Urine Updated:  03/09/20 2000     Color Yellow     Character Cloudy     Specific Gravity 1.029     Ph 6.5     Glucose Negative mg/dL      Ketones Trace mg/dL      Protein Negative mg/dL      Bilirubin Negative     Urobilinogen, Urine 1.0     Nitrite Positive     Leukocyte Esterase Moderate     Occult Blood Negative     Micro Urine Req Microscopic    URINE CULTURE(NEW) [[id number]]     Order Status:  Canceled           Plan:   Appropriate antibiotic therapy prescribed. No changes required based upon culture result.  Sent letter to patient to notify of positive culture result and encourage compliance with prescribed antibiotics.     Mamie Lora, PharmD

## 2020-05-26 ENCOUNTER — NON-PROVIDER VISIT (OUTPATIENT)
Dept: URGENT CARE | Facility: CLINIC | Age: 25
End: 2020-05-26

## 2020-05-26 DIAGNOSIS — Z11.1 PPD SCREENING TEST: ICD-10-CM

## 2020-05-26 PROCEDURE — 86580 TB INTRADERMAL TEST: CPT | Performed by: PHYSICIAN ASSISTANT

## 2020-05-26 NOTE — NON-PROVIDER
Brianna Ruby is a 24 y.o. female here for a non-provider visit for PPD placement -- Step 1 of 1    Reason for PPD:  work requirement    1. TB evaluation questionnaire completed by patient? Yes      -  If any answers marked yes did you contact a provider prior to placing? Not Indicated  2.  Patient notified to return to clinic for reading on: 05.28.20 or 05.29.20  3.  PPD Placement documentation completed on TB evaluation questionnaire? Yes  4.  Location of TB evaluation questionnaire filed: [facility]

## 2020-05-28 ENCOUNTER — NON-PROVIDER VISIT (OUTPATIENT)
Dept: URGENT CARE | Facility: CLINIC | Age: 25
End: 2020-05-28

## 2020-05-28 LAB — TB WHEAL 3D P 5 TU DIAM: NORMAL MM

## 2020-05-29 NOTE — NON-PROVIDER
Brianna Ruby is a 24 y.o. female here for a non-provider visit for PPD reading -- Step 1 of 2.      1.  Resulted in Epic under enter/edit results? Yes   2.  TB evaluation questionnaire scanned into chart and original given to patient?Yes      3. Was induration greater than 0 mm? No.    If Step 1 of 2, when is patient returning for second step 6/2/20

## 2020-06-02 ENCOUNTER — NON-PROVIDER VISIT (OUTPATIENT)
Dept: URGENT CARE | Facility: CLINIC | Age: 25
End: 2020-06-02

## 2020-06-02 DIAGNOSIS — Z11.1 PPD SCREENING TEST: ICD-10-CM

## 2020-06-02 PROCEDURE — 86580 TB INTRADERMAL TEST: CPT | Performed by: PHYSICIAN ASSISTANT

## 2020-06-02 NOTE — NON-PROVIDER
Brianna Ruby is a 24 y.o. female here for a non-provider visit for PPD placement -- Step 2 of 2    Reason for PPD:  work requirement    1. TB evaluation questionnaire completed by patient? Yes      -  If any answers marked yes did you contact a provider prior to placing? Not Indicated  2.  Patient notified to return to clinic for reading on: 6.4.20 or 6.5.20  3.  PPD Placement documentation completed on TB evaluation questionnaire? Yes  4.  Location of TB evaluation questionnaire filed: [facility]

## 2020-06-04 ENCOUNTER — NON-PROVIDER VISIT (OUTPATIENT)
Dept: URGENT CARE | Facility: CLINIC | Age: 25
End: 2020-06-04

## 2020-06-04 LAB — TB WHEAL 3D P 5 TU DIAM: NORMAL MM

## 2020-06-04 NOTE — PROGRESS NOTES
Brianna Ruby is a 24 y.o. female here for a non-provider visit for PPD reading -- Step 2 of 2.      1.  Resulted in Epic under enter/edit results? Yes   2.  TB evaluation questionnaire scanned into chart and original given to patient?Yes      3. Was induration greater than 0 mm? No.        Routed to PCP? Yes

## 2020-06-09 NOTE — DISCHARGE INSTRUCTIONS
The most likely cause of rashes in this location is almost always yeast on the skin.  These areas trap heat and moisture and are an easy place for these rashes to develop.  Apply the prescribed medication as directed.  Wash and dry the skin carefully twice daily with gentle soap and water.   36.6

## 2020-10-06 ENCOUNTER — HOSPITAL ENCOUNTER (EMERGENCY)
Facility: MEDICAL CENTER | Age: 25
End: 2020-10-06
Payer: MEDICAID

## 2020-10-06 VITALS
WEIGHT: 200 LBS | SYSTOLIC BLOOD PRESSURE: 134 MMHG | BODY MASS INDEX: 32.14 KG/M2 | HEIGHT: 66 IN | HEART RATE: 93 BPM | DIASTOLIC BLOOD PRESSURE: 94 MMHG | TEMPERATURE: 98.2 F | OXYGEN SATURATION: 100 % | RESPIRATION RATE: 18 BRPM

## 2020-10-06 PROCEDURE — 302449 STATCHG TRIAGE ONLY (STATISTIC)

## 2020-10-06 ASSESSMENT — FIBROSIS 4 INDEX: FIB4 SCORE: 0.23

## 2020-10-06 NOTE — ED NOTES
"Patient refused to  signed \"Leaving prior to physician contact\" form. Pt and andrei walked out   "

## 2020-10-06 NOTE — ED NOTES
Pt arrives to ER with c/c sore throat, cough, headache x3 days.  No known exposure to covid19.  Roommate is currently being tested for covid19.  A&ox4.  Mask on.  Pt to [facility] & advised to inform RN of any changes.

## 2023-10-12 NOTE — ASSESSMENT & PLAN NOTE
----- Message from Marquis Schreiber MD sent at 10/12/2023  7:44 AM CDT -----  Pilar Sibley repeat 1 year with micheline wallace Patient states she has developed burning at the end of urination as well as frequency of urination.  Patient has an elevated white blood cell count of 11.1  Urine analysis shows nitrite positive leukocyte esterase large WBCs large with a cloudy urine  Patient was placed on IV Rocephin and urine cultures will be followed.
